# Patient Record
Sex: MALE | Race: WHITE | Employment: STUDENT | URBAN - METROPOLITAN AREA
[De-identification: names, ages, dates, MRNs, and addresses within clinical notes are randomized per-mention and may not be internally consistent; named-entity substitution may affect disease eponyms.]

---
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
## 2020-03-09 ENCOUNTER — OFFICE VISIT (OUTPATIENT)
Dept: PRIMARY CARE CLINIC | Age: 20
End: 2020-03-09

## 2020-03-09 VITALS
OXYGEN SATURATION: 98 % | RESPIRATION RATE: 16 BRPM | DIASTOLIC BLOOD PRESSURE: 64 MMHG | HEIGHT: 70 IN | SYSTOLIC BLOOD PRESSURE: 109 MMHG | BODY MASS INDEX: 21.99 KG/M2 | TEMPERATURE: 98.2 F | WEIGHT: 153.6 LBS | HEART RATE: 94 BPM

## 2020-03-09 DIAGNOSIS — R09.81 SINUS CONGESTION: ICD-10-CM

## 2020-03-09 DIAGNOSIS — J02.9 SORE THROAT: Primary | ICD-10-CM

## 2020-03-09 LAB
S PYO AG THROAT QL: NEGATIVE
VALID INTERNAL CONTROL?: YES

## 2020-03-09 NOTE — PROGRESS NOTES
Chief Complaint   Patient presents with    Cold Symptoms     woke three days ago and he had alot of snot in the throat and was told he has glovis sensation. congestion he can feel it and throat hurts when he swallows. coughing up and vomiting and has headache      When he was in the hospital at [facility] and he stopped the meds afterwards because he was hearing voices.

## 2020-03-09 NOTE — LETTER
NOTIFICATION OF RETURN TO WORK / SCHOOL 
 
3/9/2020 Mr. Jessica Armendariz 
[address] [facility] [address] To Whom It May Concern: 
 
Jessica Armendariz was under the care of [facility]. Please excuse him from today 3/9/2020. If there are questions or concerns please have the patient contact our office. Sincerely, Rocio Orta NP

## 2020-03-09 NOTE — PROGRESS NOTES
[facility]   [address], [address] [facility]  P: [phone]  F: [phone]      Chief Complaint   Patient presents with    Cold Symptoms     woke three days ago and he had alot of snot in the throat and was told he has glovis sensation. congestion he can feel it and throat hurts when he swallows. coughing up and vomiting and has headache        SUBJECTIVE   Blaise Bautista is a 23 y.o. male who presents to clinic for Cold Symptoms (woke three days ago and he had alot of snot in the throat and was told he has glovis sensation. congestion he can feel it and throat hurts when he swallows. coughing up and vomiting and has headache ). HPI:    Olayinka Marie is a 23 yr old male who presents with 3 days of postnasal drip, sinus congestion, and sore throat. He also reports nausea with vomiting that he attributes to sinus drainage.     Patient Active Problem List    Diagnosis    Impotence due to erectile dysfunction    Hyperactive pharyngeal gag reflex    Bipolar II disorder, severe, depressed, with anxious distress ([address]    Borderline personality disorder in adolescent ([address]    Cannabis abuse, episodic    Attention deficit hyperactivity disorder (ADHD), predominantly inattentive type    Irritable bowel syndrome with both constipation and diarrhea          Past Medical History:   Diagnosis Date    ADD (attention deficit disorder)     ADD (attention deficit disorder)     Anxiety     Depression     Irritable bowel syndrome with both constipation and diarrhea 10/16/2018     Past Surgical History:   Procedure Laterality Date    HX OTHER SURGICAL      Egd     Social History     Socioeconomic History    Marital status: SINGLE     Spouse name: Not on file    Number of children: Not on file    Years of education: Not on file    Highest education level: Not on file   Occupational History    Not on file   Social Needs    Financial resource strain: Not on file    Food insecurity: Worry: Not on file     Inability: Not on file    Transportation needs:     Medical: Not on file     Non-medical: Not on file   Tobacco Use    Smoking status: Never Smoker    Smokeless tobacco: Never Used   Substance and Sexual Activity    Alcohol use: No    Drug use: Yes     Types: Marijuana    Sexual activity: Never   Lifestyle    Physical activity:     Days per week: Not on file     Minutes per session: Not on file    Stress: Not on file   Relationships    Social connections:     Talks on phone: Not on file     Gets together: Not on file     Attends Jain service: Not on file     Active member of club or organization: Not on file     Attends meetings of clubs or organizations: Not on file     Relationship status: Not on file    Intimate partner violence:     Fear of current or ex partner: Not on file     Emotionally abused: Not on file     Physically abused: Not on file     Forced sexual activity: Not on file   Other Topics Concern    Not on file   Social History Narrative    Not on file     Family History   Problem Relation Age of Onset    Other Mother         autoimmune disease - Sjogrens    Cancer Maternal Grandfather 61        stomach     No Known Allergies    Current Outpatient Medications   Medication Sig Dispense Refill    benzonatate (TESSALON PERLES) 100 mg capsule Take 1 Cap by mouth three (3) times daily as needed for Cough. 30 Cap 2    omeprazole (PRILOSEC) 40 mg capsule Take 1 Cap by mouth daily. 14 Cap 1    dicyclomine (BENTYL) 10 mg capsule TAKE 1 CAPSULE BY MOUTH THREE TIMES DAILY 90 Cap 0    ARIPiprazole (ABILIFY) 10 mg tablet Take 1 Tab by mouth daily. 90 Tab 1    OXcarbazepine (TRILEPTAL) 300 mg tablet Take 1 Tab by mouth two (2) times a day. 180 Tab 1    traZODone (DESYREL) 50 mg tablet Take 1 Tab by mouth nightly. Indications: insomnia associated with depression 90 Tab 1           The medications were reviewed and updated in the medical record.   The past medical history, past surgical history, and family history were reviewed and updated in the medical record. REVIEW OF SYSTEMS   Review of Systems   Constitutional: Negative for malaise/fatigue. HENT: Positive for congestion, sinus pain and sore throat. Eyes: Negative for blurred vision and pain. Respiratory: Negative for cough and shortness of breath. Cardiovascular: Negative for chest pain and palpitations. Gastrointestinal: Negative for abdominal pain and heartburn. Genitourinary: Negative for frequency and urgency. Musculoskeletal: Negative for joint pain and myalgias. Neurological: Negative for dizziness, tingling, sensory change, weakness and headaches. Psychiatric/Behavioral: Negative for depression, memory loss and substance abuse. PHYSICAL EXAM     Visit Vitals  /64 (BP 1 Location: Left arm, BP Patient Position: Sitting)   Pulse 94   Temp 98.2 °F (36.8 °C) (Oral)   Resp 16   Ht 5' 10\" (1.778 m)   Wt 153 lb 9.6 oz (69.7 kg)   SpO2 98%   BMI 22.04 kg/m²       Physical Exam  Vitals signs and nursing note reviewed. HENT:      Head: Normocephalic and atraumatic. Right Ear: Hearing, tympanic membrane, ear canal and external ear normal.      Left Ear: Hearing, tympanic membrane, ear canal and external ear normal.      Nose: Congestion and rhinorrhea present. Mouth/Throat:      Pharynx: Posterior oropharyngeal erythema present. Tonsils: No tonsillar exudate. Swellin+ on the right. 1+ on the left. Cardiovascular:      Rate and Rhythm: Normal rate and regular rhythm. Heart sounds: Normal heart sounds. Pulmonary:      Effort: Pulmonary effort is normal.      Breath sounds: Normal breath sounds. Musculoskeletal: Normal range of motion. Skin:     General: Skin is warm and dry. Neurological:      Mental Status: He is alert and oriented to person, place, and time. Gait: Gait is intact.    Psychiatric:         Mood and Affect: Affect normal.         Judgment: Judgment normal.       ASSESSMENT/ PLAN   Diagnoses and all orders for this visit:    1. Sore throat  -     AMB POC RAPID STREP A negative   -Culture declined. Discussed likely viral symptoms. Encouraged rest, increased fluids, Tylenol/Ibuprofen for pain/fever and warm salt-water gargles as tolerated. Return to office if no improvement in 4-5 days. 2.  Sinus congestion       -Sinus Treatments:  1. Nasal rinses:  Saline rinses or salt water rinses or Neti pot  2. Anti-histamines: allegra (fenofexadine) or claritn (loratidine) or zyrtec (certizine)  3. Nasal steroids: Nasacort and Flonase (are over the counter & prescription)    Disclaimer:  Advised patient to call back or return to office if symptoms worsen/change/persist.  Discussed expected course/resolution/complications of diagnosis in detail with patient.     Medication risks/benefits/alternatives discussed with patient. Patient was given an after visit summary which includes diagnoses, current medications, & vitals.      Discussed patient instructions and advised to read to all patient instructions regarding care.      Patient expressed understanding with the diagnosis and plan. This note will not be viewable in [address]         Cassy Arnold NP  3/9/2020        (This document has been electronically signed)

## 2020-03-11 ENCOUNTER — HOSPITAL ENCOUNTER (EMERGENCY)
Age: 20
Discharge: HOME | End: 2020-03-12
Attending: EMERGENCY MEDICINE
Payer: COMMERCIAL

## 2020-03-11 DIAGNOSIS — R11.2 CANNABINOID HYPEREMESIS SYNDROME: Primary | ICD-10-CM

## 2020-03-11 DIAGNOSIS — F12.90 CANNABINOID HYPEREMESIS SYNDROME: Primary | ICD-10-CM

## 2020-03-11 LAB
ALBUMIN SERPL-MCNC: 4.3 G/DL (ref 3.5–5)
ALBUMIN/GLOB SERPL: 1.4 {RATIO} (ref 1.1–2.2)
ALP SERPL-CCNC: 90 U/L (ref 45–117)
ALT SERPL-CCNC: 17 U/L (ref 12–78)
ANION GAP SERPL CALC-SCNC: 11 MMOL/L (ref 5–15)
AST SERPL-CCNC: 12 U/L (ref 15–37)
BASOPHILS # BLD: 0 K/UL (ref 0–0.1)
BASOPHILS NFR BLD: 0 % (ref 0–1)
BILIRUB SERPL-MCNC: 0.9 MG/DL (ref 0.2–1)
BUN SERPL-MCNC: 16 MG/DL (ref 6–20)
BUN/CREAT SERPL: 15 (ref 12–20)
CALCIUM SERPL-MCNC: 8.7 MG/DL (ref 8.5–10.1)
CHLORIDE SERPL-SCNC: 106 MMOL/L (ref 97–108)
CO2 SERPL-SCNC: 27 MMOL/L (ref 21–32)
COMMENT, HOLDF: NORMAL
CREAT SERPL-MCNC: 1.07 MG/DL (ref 0.7–1.3)
D DIMER PPP FEU-MCNC: <0.19 MG/L FEU (ref 0–0.65)
DIFFERENTIAL METHOD BLD: NORMAL
EOSINOPHIL # BLD: 0.2 K/UL (ref 0–0.4)
EOSINOPHIL NFR BLD: 2 % (ref 0–7)
ERYTHROCYTE [DISTWIDTH] IN BLOOD BY AUTOMATED COUNT: 11.8 % (ref 11.5–14.5)
GLOBULIN SER CALC-MCNC: 3 G/DL (ref 2–4)
GLUCOSE SERPL-MCNC: 81 MG/DL (ref 65–100)
HCT VFR BLD AUTO: 38.1 % (ref 36.6–50.3)
HGB BLD-MCNC: 13.4 G/DL (ref 12.1–17)
IMM GRANULOCYTES # BLD AUTO: 0 K/UL (ref 0–0.04)
IMM GRANULOCYTES NFR BLD AUTO: 0 % (ref 0–0.5)
LIPASE SERPL-CCNC: 129 U/L (ref 73–393)
LYMPHOCYTES # BLD: 2.7 K/UL (ref 0.8–3.5)
LYMPHOCYTES NFR BLD: 29 % (ref 12–49)
MAGNESIUM SERPL-MCNC: 2 MG/DL (ref 1.6–2.4)
MCH RBC QN AUTO: 32 PG (ref 26–34)
MCHC RBC AUTO-ENTMCNC: 35.2 G/DL (ref 30–36.5)
MCV RBC AUTO: 90.9 FL (ref 80–99)
MONOCYTES # BLD: 1 K/UL (ref 0–1)
MONOCYTES NFR BLD: 11 % (ref 5–13)
NEUTS SEG # BLD: 5.1 K/UL (ref 1.8–8)
NEUTS SEG NFR BLD: 58 % (ref 32–75)
NRBC # BLD: 0 K/UL (ref 0–0.01)
NRBC BLD-RTO: 0 PER 100 WBC
PHOSPHATE SERPL-MCNC: 4.4 MG/DL (ref 2.6–4.7)
PLATELET # BLD AUTO: 231 K/UL (ref 150–400)
PMV BLD AUTO: 10.3 FL (ref 8.9–12.9)
POTASSIUM SERPL-SCNC: 3.7 MMOL/L (ref 3.5–5.1)
PROT SERPL-MCNC: 7.3 G/DL (ref 6.4–8.2)
RBC # BLD AUTO: 4.19 M/UL (ref 4.1–5.7)
SAMPLES BEING HELD,HOLD: NORMAL
SODIUM SERPL-SCNC: 144 MMOL/L (ref 136–145)
TROPONIN I SERPL-MCNC: <0.05 NG/ML
WBC # BLD AUTO: 9 K/UL (ref 4.1–11.1)

## 2020-03-11 PROCEDURE — 85379 FIBRIN DEGRADATION QUANT: CPT

## 2020-03-11 PROCEDURE — 84484 ASSAY OF TROPONIN QUANT: CPT

## 2020-03-11 PROCEDURE — 83735 ASSAY OF MAGNESIUM: CPT

## 2020-03-11 PROCEDURE — 93005 ELECTROCARDIOGRAM TRACING: CPT

## 2020-03-11 PROCEDURE — 74011250636 HC RX REV CODE- 250/636: Performed by: EMERGENCY MEDICINE

## 2020-03-11 PROCEDURE — 99285 EMERGENCY DEPT VISIT HI MDM: CPT

## 2020-03-11 PROCEDURE — 85025 COMPLETE CBC W/AUTO DIFF WBC: CPT

## 2020-03-11 PROCEDURE — 96375 TX/PRO/DX INJ NEW DRUG ADDON: CPT

## 2020-03-11 PROCEDURE — 36415 COLL VENOUS BLD VENIPUNCTURE: CPT

## 2020-03-11 PROCEDURE — 96374 THER/PROPH/DIAG INJ IV PUSH: CPT

## 2020-03-11 PROCEDURE — 83690 ASSAY OF LIPASE: CPT

## 2020-03-11 PROCEDURE — 84100 ASSAY OF PHOSPHORUS: CPT

## 2020-03-11 PROCEDURE — 80053 COMPREHEN METABOLIC PANEL: CPT

## 2020-03-11 RX ORDER — METOCLOPRAMIDE HYDROCHLORIDE 5 MG/ML
10 INJECTION INTRAMUSCULAR; INTRAVENOUS
Status: COMPLETED | OUTPATIENT
Start: 2020-03-12 | End: 2020-03-11

## 2020-03-11 RX ORDER — KETOROLAC TROMETHAMINE 30 MG/ML
30 INJECTION, SOLUTION INTRAMUSCULAR; INTRAVENOUS
Status: COMPLETED | OUTPATIENT
Start: 2020-03-12 | End: 2020-03-11

## 2020-03-11 RX ORDER — HALOPERIDOL 5 MG/ML
5 INJECTION INTRAMUSCULAR
Status: COMPLETED | OUTPATIENT
Start: 2020-03-12 | End: 2020-03-11

## 2020-03-11 RX ADMIN — HALOPERIDOL LACTATE 5 MG: 5 INJECTION INTRAMUSCULAR at 23:35

## 2020-03-11 RX ADMIN — KETOROLAC TROMETHAMINE 30 MG: 30 INJECTION, SOLUTION INTRAMUSCULAR at 23:36

## 2020-03-11 RX ADMIN — METOCLOPRAMIDE 10 MG: 5 INJECTION, SOLUTION INTRAMUSCULAR; INTRAVENOUS at 23:34

## 2020-03-11 RX ADMIN — SODIUM CHLORIDE 1000 ML: 900 INJECTION, SOLUTION INTRAVENOUS at 23:33

## 2020-03-12 ENCOUNTER — APPOINTMENT (OUTPATIENT)
Dept: GENERAL RADIOLOGY | Age: 20
End: 2020-03-12
Attending: EMERGENCY MEDICINE
Payer: COMMERCIAL

## 2020-03-12 VITALS
BODY MASS INDEX: 22.38 KG/M2 | DIASTOLIC BLOOD PRESSURE: 68 MMHG | RESPIRATION RATE: 15 BRPM | OXYGEN SATURATION: 97 % | HEART RATE: 71 BPM | SYSTOLIC BLOOD PRESSURE: 109 MMHG | WEIGHT: 156.31 LBS | TEMPERATURE: 98.9 F | HEIGHT: 70 IN

## 2020-03-12 LAB
AMPHET UR QL SCN: NEGATIVE
APPEARANCE UR: CLEAR
ATRIAL RATE: 86 BPM
BACTERIA URNS QL MICRO: ABNORMAL /HPF
BARBITURATES UR QL SCN: NEGATIVE
BENZODIAZ UR QL: NEGATIVE
BILIRUB UR QL: NEGATIVE
CALCULATED P AXIS, ECG09: 64 DEGREES
CALCULATED R AXIS, ECG10: 80 DEGREES
CALCULATED T AXIS, ECG11: 58 DEGREES
CANNABINOIDS UR QL SCN: POSITIVE
COCAINE UR QL SCN: NEGATIVE
COLOR UR: ABNORMAL
DIAGNOSIS, 93000: NORMAL
DRUG SCRN COMMENT,DRGCM: ABNORMAL
EPITH CASTS URNS QL MICRO: ABNORMAL /LPF
GLUCOSE UR STRIP.AUTO-MCNC: NEGATIVE MG/DL
HGB UR QL STRIP: NEGATIVE
KETONES UR QL STRIP.AUTO: NEGATIVE MG/DL
LEUKOCYTE ESTERASE UR QL STRIP.AUTO: NEGATIVE
METHADONE UR QL: NEGATIVE
NITRITE UR QL STRIP.AUTO: NEGATIVE
OPIATES UR QL: NEGATIVE
P-R INTERVAL, ECG05: 108 MS
PCP UR QL: NEGATIVE
PH UR STRIP: 6 [PH] (ref 5–8)
PROT UR STRIP-MCNC: NEGATIVE MG/DL
Q-T INTERVAL, ECG07: 338 MS
QRS DURATION, ECG06: 88 MS
QTC CALCULATION (BEZET), ECG08: 404 MS
RBC #/AREA URNS HPF: ABNORMAL /HPF (ref 0–5)
SP GR UR REFRACTOMETRY: 1.03 (ref 1–1.03)
UA: UC IF INDICATED,UAUC: ABNORMAL
UROBILINOGEN UR QL STRIP.AUTO: 1 EU/DL (ref 0.2–1)
VENTRICULAR RATE, ECG03: 86 BPM
WBC URNS QL MICRO: ABNORMAL /HPF (ref 0–4)

## 2020-03-12 PROCEDURE — 81001 URINALYSIS AUTO W/SCOPE: CPT

## 2020-03-12 PROCEDURE — 80307 DRUG TEST PRSMV CHEM ANLYZR: CPT

## 2020-03-12 PROCEDURE — 71046 X-RAY EXAM CHEST 2 VIEWS: CPT

## 2020-03-12 PROCEDURE — 87086 URINE CULTURE/COLONY COUNT: CPT

## 2020-03-12 RX ORDER — DICYCLOMINE HYDROCHLORIDE 10 MG/1
10 CAPSULE ORAL 4 TIMES DAILY
Qty: 20 CAP | Refills: 0 | Status: SHIPPED | OUTPATIENT
Start: 2020-03-12 | End: 2020-03-17

## 2020-03-12 RX ORDER — ONDANSETRON 8 MG/1
8 TABLET, ORALLY DISINTEGRATING ORAL
Qty: 10 TAB | Refills: 0 | Status: SHIPPED | OUTPATIENT
Start: 2020-03-12 | End: 2020-12-07

## 2020-03-12 NOTE — ED PROVIDER NOTES
The patient is a 24-year-old male with a past medical history significant for ADD, anxiety, bipolar disorder, IBS, marijuana abuse who presents to the ED with a complaint of intractable nausea, vomiting today accompanied by midsternal chest pain that he described as dull, crampy, that radiated to his throat and epigastrium area, severity 8 out of 10, without any aggravating or relieving factors. The patient state that he has been tired and dry heaving for the last 30 minutes. He denies any fever, sore throat, headache, blurred vision, cough, congestion, chest pain and shortness of breath with exertion, diarrhea, constipation, dysuria, sick contact at home, skin rash, unusual food sources, recent travel, prior abdominal surgery or history of the same. Past Medical History:   Diagnosis Date    ADD (attention deficit disorder)     ADD (attention deficit disorder)     Anxiety     Bipolar 1 disorder (HCC)     Depression     Irritable bowel syndrome with both constipation and diarrhea 10/16/2018       Past Surgical History:   Procedure Laterality Date    HX OTHER SURGICAL      Egd         Family History:   Problem Relation Age of Onset    Other Mother         autoimmune disease - Sjogrens    Cancer Maternal Grandfather 61        stomach       Social History     Socioeconomic History    Marital status: SINGLE     Spouse name: Not on file    Number of children: Not on file    Years of education: Not on file    Highest education level: Not on file   Occupational History    Not on file   Social Needs    Financial resource strain: Not on file    Food insecurity     Worry: Not on file     Inability: Not on file    Transportation needs     Medical: Not on file     Non-medical: Not on file   Tobacco Use    Smoking status: Current Some Day Smoker    Smokeless tobacco: Current User   Substance and Sexual Activity    Alcohol use:  Yes    Drug use: Yes     Types: Marijuana    Sexual activity: Never Lifestyle    Physical activity     Days per week: Not on file     Minutes per session: Not on file    Stress: Not on file   Relationships    Social connections     Talks on phone: Not on file     Gets together: Not on file     Attends Restoration service: Not on file     Active member of club or organization: Not on file     Attends meetings of clubs or organizations: Not on file     Relationship status: Not on file    Intimate partner violence     Fear of current or ex partner: Not on file     Emotionally abused: Not on file     Physically abused: Not on file     Forced sexual activity: Not on file   Other Topics Concern    Not on file   Social History Narrative    Not on file         ALLERGIES: Patient has no known allergies. Review of Systems   All other systems reviewed and are negative. Vitals:    03/11/20 2257 03/11/20 2300   BP: 129/90 125/76   Pulse:  90   Resp: 14 14   Temp:  98.9 °F (37.2 °C)   SpO2: 99% 99%   Weight:  70.9 kg (156 lb 4.9 oz)   Height:  5' 10\" (1.778 m)            Physical Exam  Vitals signs and nursing note reviewed. CONSTITUTIONAL: Well-appearing; well-nourished; in mild distress; very anxious and restless  HEAD: Normocephalic; atraumatic  EYES: PERRL; EOM intact; conjunctiva and sclera are clear bilaterally. ENT: No rhinorrhea; normal pharynx with no tonsillar hypertrophy; mucous membranes pink/moist, no erythema, no exudate. NECK: Supple; non-tender; no cervical lymphadenopathy  CARD: Normal S1, S2; no murmurs, rubs, or gallops. Regular rate and rhythm. RESP: Normal respiratory effort; breath sounds clear and equal bilaterally; no wheezes, rhonchi, or rales. ABD: Normal bowel sounds; non-distended; non-tender; no palpable organomegaly, no masses, no bruits. Back Exam: Normal inspection; no vertebral point tenderness, no CVA tenderness. Normal range of motion.   EXT: Normal ROM in all four extremities; non-tender to palpation; no swelling or deformity; distal pulses are normal, no edema. SKIN: Warm; dry; no rash. NEURO:Alert and oriented x 3, coherent, RYAN-XII grossly intact, sensory and motor are non-focal.        MDM  Number of Diagnoses or Management Options  Diagnosis management comments: Assessment: Intractable nausea, vomiting with chest pain and history of marijuana abuse rule out marijuana induced hyperemesis syndrome, electrolyte abnormality and dehydration. The patient is very anxious and restless but hemodynamically stable. Plan: Lab/IV fluid/antiemetic and analgesia/chest x-ray/serial exam/ Monitor and Reevaluate. Amount and/or Complexity of Data Reviewed  Clinical lab tests: ordered and reviewed  Tests in the radiology section of CPT®: ordered and reviewed  Tests in the medicine section of CPT®: reviewed and ordered  Discussion of test results with the performing providers: yes  Decide to obtain previous medical records or to obtain history from someone other than the patient: yes  Obtain history from someone other than the patient: yes  Review and summarize past medical records: yes  Discuss the patient with other providers: yes  Independent visualization of images, tracings, or specimens: yes    Risk of Complications, Morbidity, and/or Mortality  Presenting problems: moderate  Diagnostic procedures: moderate  Management options: moderate           Procedures    ED EKG interpretation:  Rhythm: normal sinus rhythm; and regular . Rate (approx.): 86; Axis: normal; P wave: normal; QRS interval: normal ; ST/T wave: normal; in  Lead: Diffusely; short ME interval; Other findings: borderline ekg. This EKG was interpreted by Vani Tyson MD,ED Provider. XRAY INTERPRETATION (ED MD)  Chest Xray  No acute process seen. Normal heart size. No bony abnormalities. No infiltrate. Maria Alejandra Moser MD 11:28 PM    Progress Note:   Pt has been reexamined by Vani Tyson MD. Pt is feeling much better. Symptoms have improved.  All available results have been reviewed with pt and any available family. The patient was given p.o. challenge that he tolerated well. He denies any further discomfort. Pt understands sx, dx, and tx in ED. Care plan has been outlined and questions have been answered. Pt is ready to go home. Will send home on marijuana abuse/hyperemesis/oral rehydration instruction. Prescription of Bentyl and Zofran. Outpatient referral with PCP as needed. Written by Mingo Pizarro MD,1:06 AM    .   .

## 2020-03-12 NOTE — ED NOTES
Patient PO challenged per MD request. Provided patient with apple juice. Patient tolerated well. No difficulty noted.

## 2020-03-12 NOTE — ED NOTES
Patient unable to void at this time. IV fluids infusing. Urinal at bedside. Will continue to monitor.

## 2020-03-12 NOTE — DISCHARGE INSTRUCTIONS
Patient Education     Patient Education        Marijuana Use in Teens: Care Instructions  Overview  During your exam, traces of marijuana were found in your body. The two most active chemicals in marijuana are THC and CBD. THC affects how you think, act, and feel. It can make you feel very happy or \"high. \" CBD can help you feel relaxed without the \"high. \" Marijuana products usually contain both THC and CBD. THC usually can be found in urine for a few days after marijuana is used. If you regularly use a lot of marijuana, THC may be found for weeks after use has stopped. There are many types, or strains, of marijuana. Each strain has specific THC-to-CBD ratios. Because of this, some strains have different kinds of effects than others. For example, if a strain of marijuana has a higher ratio of THC to CBD, it's more likely to affect your judgment, coordination, and decision making. In the [place], it's against federal law to possess, sell, give away, or grow marijuana for any purpose. But many states allow people with certain health problems to buy or grow it for their own use. And some states allow people over age 24 to use it for recreational reasons. Medical marijuana may be prescribed for teens with certain medical conditions. But recreational marijuana by teens is illegal everywhere in the [place]. Many schools and employers have strict rules about drug use. A positive drug test might cause you to be expelled from school or keep you from getting into a school you want to attend. You might not be able to take part in school sports or clubs. Or it might cause you to lose a job or keep you from getting hired. If you use marijuana, take steps to lower your risk. Follow-up care is a key part of your teen's treatment and safety. Be sure to make and go to all appointments, and call your doctor if your teen is having problems.  It's also a good idea to know your teen's test results and keep a list of the medicines your teen takes. How can you care for yourself at home? · To have the lowest risk, don't use marijuana. But if you do use it, limit your use. · Know what you're using. Choose products that have low levels of THC. The type (or strain), strength, and effects of marijuana can vary greatly. And understand that how soon you may feel the effects of the product you use and how long those effects may last can vary. It depends on how you take it. · Don't drive or operate machinery after you use marijuana. Using marijuana may affect your judgment, coordination, and decision making. · Don't smoke marijuana. The smoke can damage your lungs. If you do smoke it, don't breathe in deeply. And don't hold your breath. · Don't use marijuana with alcohol, tobacco, or illegal drugs. · Don't use synthetic marijuana, such as K2 and Spice. These drugs are stronger than marijuana. They can have very bad side effects. · Reduce the risk of medicine interactions. Marijuana can be dangerous if you take it with blood thinners or with medicines that make you sleepy, control your mood, or lower your blood pressure. · Keep others safe. Store marijuana in a safe and secure place. This is especially important with edible marijuana, which can be easily mistaken for treats or snacks. Make sure that children, friends, family, and pets can't get to it. And protect others from secondhand smoke. When should you call for help? Call your doctor now or seek immediate medical care if:    · You have new or worse symptoms of cannabis hyperemesis syndrome (CHS), such as:  ? Vomiting that doesn't stop. ? Not being able to keep down fluids. ? Belly pain. ? Symptoms that go away briefly when you take a hot bath or shower. This is one of the signs of CHS.     · You have symptoms of dehydration, such as:  ? Dry eyes and a dry mouth. ? Passing only a little dark urine. ?  Feeling thirstier than usual.    Watch closely for changes in your health, and contact your doctor if:    · You think you have a problem with marijuana use. Where can you learn more? Go to http://radha-ora.info/. Enter P293 in the search box to learn more about \"Marijuana Use in Teens: Care Instructions. \"  Current as of: February 5, 2019  Content Version: 12.2  © 2071-5740 [employer]. Care instructions adapted under license by [employer] (which disclaims liability or warranty for this information). If you have questions about a medical condition or this instruction, always ask your healthcare professional. [place] [address] any warranty or liability for your use of this information. Nausea and Vomiting: Care Instructions  Your Care Instructions    When you are nauseated, you may feel weak and sweaty and notice a lot of saliva in your mouth. Nausea often leads to vomiting. Most of the time you do not need to worry about nausea and vomiting, but they can be signs of other illnesses. Two common causes of nausea and vomiting are stomach flu and food poisoning. Nausea and vomiting from viral stomach flu will usually start to improve within 24 hours. Nausea and vomiting from food poisoning may last from 12 to 48 hours. The doctor has checked you carefully, but problems can develop later. If you notice any problems or new symptoms, get medical treatment right away. Follow-up care is a key part of your treatment and safety. Be sure to make and go to all appointments, and call your doctor if you are having problems. It's also a good idea to know your test results and keep a list of the medicines you take. How can you care for yourself at home? · To prevent dehydration, drink plenty of fluids, enough so that your urine is light yellow or clear like water. Choose water and other caffeine-free clear liquids until you feel better.  If you have kidney, heart, or liver disease and have to limit fluids, talk with your doctor before you increase the amount of fluids you drink. · Rest in bed until you feel better. · When you are able to eat, try clear soups, mild foods, and liquids until all symptoms are gone for 12 to 48 hours. Other good choices include dry toast, crackers, cooked cereal, and gelatin dessert, such as Jell-O. When should you call for help? Call 911 anytime you think you may need emergency care. For example, call if:    · You passed out (lost consciousness).    Call your doctor now or seek immediate medical care if:    · You have symptoms of dehydration, such as:  ? Dry eyes and a dry mouth. ? Passing only a little dark urine. ? Feeling thirstier than usual.     · You have new or worsening belly pain.     · You have a new or higher fever.     · You vomit blood or what looks like coffee grounds.    Watch closely for changes in your health, and be sure to contact your doctor if:    · You have ongoing nausea and vomiting.     · Your vomiting is getting worse.     · Your vomiting lasts longer than 2 days.     · You are not getting better as expected. Where can you learn more? Go to http://radha-ora.info/. Enter 25 [id number] in the search box to learn more about \"Nausea and Vomiting: Care Instructions. \"  Current as of: June 26, 2019  Content Version: 12.2  © 9195-8068 [employer]. Care instructions adapted under license by [employer] (which disclaims liability or warranty for this information). If you have questions about a medical condition or this instruction, always ask your healthcare professional. [place] [address] any warranty or liability for your use of this information.

## 2020-03-12 NOTE — ED TRIAGE NOTES
Patient arrives for c/o, \"I've throwing up and having pain in my center of my chest for the past 20 minutes. \".

## 2020-03-12 NOTE — ED NOTES
Discharge instructions reviewed with pt and copy given along with RX by this RN. Pt ambulatory form ED accompanied by girlfriend in no sign of distress or discomfort. Patient educated on follow up with PCP.

## 2020-03-13 LAB
BACTERIA SPEC CULT: NORMAL
SERVICE CMNT-IMP: NORMAL

## 2020-10-14 ENCOUNTER — OFFICE VISIT (OUTPATIENT)
Dept: PRIMARY CARE CLINIC | Age: 20
End: 2020-10-14
Payer: COMMERCIAL

## 2020-10-14 VITALS
RESPIRATION RATE: 16 BRPM | WEIGHT: 135 LBS | OXYGEN SATURATION: 98 % | DIASTOLIC BLOOD PRESSURE: 69 MMHG | HEIGHT: 70 IN | TEMPERATURE: 98.7 F | BODY MASS INDEX: 19.33 KG/M2 | HEART RATE: 80 BPM | SYSTOLIC BLOOD PRESSURE: 109 MMHG

## 2020-10-14 DIAGNOSIS — R56.9 SEIZURE-LIKE ACTIVITY (HCC): Primary | ICD-10-CM

## 2020-10-14 LAB
ALBUMIN SERPL-MCNC: 4.7 G/DL (ref 3.5–5)
ALBUMIN/GLOB SERPL: 1.6 {RATIO} (ref 1.1–2.2)
ALP SERPL-CCNC: 82 U/L (ref 45–117)
ALT SERPL-CCNC: 16 U/L (ref 12–78)
ANION GAP SERPL CALC-SCNC: 6 MMOL/L (ref 5–15)
AST SERPL-CCNC: 12 U/L (ref 15–37)
BILIRUB SERPL-MCNC: 0.6 MG/DL (ref 0.2–1)
BUN SERPL-MCNC: 14 MG/DL (ref 6–20)
BUN/CREAT SERPL: 14 (ref 12–20)
CALCIUM SERPL-MCNC: 9.6 MG/DL (ref 8.5–10.1)
CHLORIDE SERPL-SCNC: 106 MMOL/L (ref 97–108)
CK SERPL-CCNC: 112 U/L (ref 39–308)
CO2 SERPL-SCNC: 29 MMOL/L (ref 21–32)
CREAT SERPL-MCNC: 1 MG/DL (ref 0.7–1.3)
ERYTHROCYTE [DISTWIDTH] IN BLOOD BY AUTOMATED COUNT: 11.9 % (ref 11.5–14.5)
GLOBULIN SER CALC-MCNC: 2.9 G/DL (ref 2–4)
GLUCOSE SERPL-MCNC: 88 MG/DL (ref 65–100)
HCT VFR BLD AUTO: 41.9 % (ref 36.6–50.3)
HGB BLD-MCNC: 14.3 G/DL (ref 12.1–17)
MAGNESIUM SERPL-MCNC: 2.2 MG/DL (ref 1.6–2.4)
MCH RBC QN AUTO: 32.4 PG (ref 26–34)
MCHC RBC AUTO-ENTMCNC: 34.1 G/DL (ref 30–36.5)
MCV RBC AUTO: 94.8 FL (ref 80–99)
NRBC # BLD: 0 K/UL (ref 0–0.01)
NRBC BLD-RTO: 0 PER 100 WBC
PHOSPHATE SERPL-MCNC: 4.3 MG/DL (ref 2.6–4.7)
PLATELET # BLD AUTO: 264 K/UL (ref 150–400)
PMV BLD AUTO: 10.6 FL (ref 8.9–12.9)
POTASSIUM SERPL-SCNC: 4.4 MMOL/L (ref 3.5–5.1)
PROT SERPL-MCNC: 7.6 G/DL (ref 6.4–8.2)
RBC # BLD AUTO: 4.42 M/UL (ref 4.1–5.7)
SODIUM SERPL-SCNC: 141 MMOL/L (ref 136–145)
TSH SERPL DL<=0.05 MIU/L-ACNC: 0.62 UIU/ML (ref 0.36–3.74)
WBC # BLD AUTO: 5.7 K/UL (ref 4.1–11.1)

## 2020-10-14 PROCEDURE — 99213 OFFICE O/P EST LOW 20 MIN: CPT | Performed by: NURSE PRACTITIONER

## 2020-10-14 NOTE — PROGRESS NOTES
Chief Complaint   Patient presents with    Spasms     patient presents today c/o muscle spasms that have not resolved

## 2020-10-14 NOTE — PROGRESS NOTES
[facility]   [address], [address] [facility]  P: [phone]  F: [phone]    IRVIN Hopson is a 23 y.o. male who presents to clinic for Spasms (patient presents today c/o muscle spasms that have not resolved). Presents today after an episode last week where he felt dizzy for about 45 minutes, and had a several minute episode of muscle twitching including his arms and mouth. He does report smoking marijuana prior to the episode but denies alcohol or other illicit drug use. He states the movements stopped on their own, he did not seek medical treatment afterwards. Denies any bowel or bladder incontinence during the episode, denies any fatigue or tiredness afterwards. He denies a previous history of seizures, does report that he is off all mental health medication that he has been taking for bipolar and borderline personality disorder including oxcarbazepine and Abilify.       Patient Active Problem List    Diagnosis    Impotence due to erectile dysfunction    Hyperactive pharyngeal gag reflex    Bipolar II disorder, severe, depressed, with anxious distress ([address]    Borderline personality disorder in adolescent [facility]    Cannabis abuse, episodic    Attention deficit hyperactivity disorder (ADHD), predominantly inattentive type    Irritable bowel syndrome with both constipation and diarrhea      Past Medical History:   Diagnosis Date    ADD (attention deficit disorder)     ADD (attention deficit disorder)     Anxiety     Bipolar 1 disorder ([facility]     Depression     Irritable bowel syndrome with both constipation and diarrhea 10/16/2018     Past Surgical History:   Procedure Laterality Date    HX OTHER SURGICAL      Egd     Social History     Socioeconomic History    Marital status: SINGLE     Spouse name: Not on file    Number of children: Not on file    Years of education: Not on file    Highest education level: Not on file   Occupational History    Not on file   Social Needs    Financial resource strain: Not on file    Food insecurity     Worry: Not on file     Inability: Not on file    Transportation needs     Medical: Not on file     Non-medical: Not on file   Tobacco Use    Smoking status: Current Some Day Smoker    Smokeless tobacco: Current User   Substance and Sexual Activity    Alcohol use: Yes    Drug use: Yes     Types: Marijuana    Sexual activity: Never   Lifestyle    Physical activity     Days per week: Not on file     Minutes per session: Not on file    Stress: Not on file   Relationships    Social connections     Talks on phone: Not on file     Gets together: Not on file     Attends [employer] service: Not on file     Active member of club or organization: Not on file     Attends meetings of clubs or organizations: Not on file     Relationship status: Not on file    Intimate partner violence     Fear of current or ex partner: Not on file     Emotionally abused: Not on file     Physically abused: Not on file     Forced sexual activity: Not on file   Other Topics Concern    Not on file   Social History Narrative    Not on file     Family History   Problem Relation Age of Onset    Other Mother         autoimmune disease - Sjogrens    Cancer Maternal Grandfather 61        stomach     No Known Allergies    Current Outpatient Medications   Medication Sig Dispense Refill    omeprazole (PRILOSEC) 40 mg capsule Take 1 Cap by mouth daily. 14 Cap 1    ARIPiprazole (ABILIFY) 10 mg tablet Take 1 Tab by mouth daily. 90 Tab 1    OXcarbazepine (TRILEPTAL) 300 mg tablet Take 1 Tab by mouth two (2) times a day. 180 Tab 1    traZODone (DESYREL) 50 mg tablet Take 1 Tab by mouth nightly. Indications: insomnia associated with depression 90 Tab 1    ondansetron (Zofran ODT) 8 mg disintegrating tablet Take 1 Tab by mouth every eight (8) hours as needed for Nausea or Vomiting.  10 Tab 0    benzonatate (TESSALON PERLES) 100 mg capsule Take 1 Cap by mouth three (3) times daily as needed for Cough. 30 Cap 2           The medications were reviewed and updated in the medical record. The past medical history, past surgical history, and family history were reviewed and updated in the medical record. REVIEW OF SYSTEMS   Review of Systems   Constitutional: Negative for malaise/fatigue. HENT: Negative for congestion. Eyes: Negative for blurred vision and pain. Respiratory: Negative for cough and shortness of breath. Cardiovascular: Negative for chest pain and palpitations. Gastrointestinal: Negative for abdominal pain and heartburn. Genitourinary: Negative for frequency and urgency. Musculoskeletal: Negative for joint pain and myalgias. Neurological: Negative for dizziness, tingling, sensory change, weakness and headaches. Psychiatric/Behavioral: Negative for depression, memory loss and substance abuse. PHYSICAL EXAM     Visit Vitals  /69 (BP 1 Location: Left arm, BP Patient Position: Sitting)   Pulse 80   Temp 98.7 °F (37.1 °C) (Temporal)   Resp 16   Ht 5' 10\" (1.778 m)   Wt 135 lb (61.2 kg)   SpO2 98%   BMI 19.37 kg/m²       Physical Exam  Vitals signs and nursing note reviewed. Constitutional:       Comments: Thin appearing     HENT:      Head: Normocephalic and atraumatic. Right Ear: External ear normal.      Left Ear: External ear normal.   Eyes:      Pupils: Pupils are equal, round, and reactive to light. Cardiovascular:      Rate and Rhythm: Normal rate and regular rhythm. Heart sounds: Normal heart sounds, S1 normal and S2 normal. No murmur. No friction rub. No gallop. Pulmonary:      Effort: Pulmonary effort is normal.      Breath sounds: Normal breath sounds. Musculoskeletal: Normal range of motion. Skin:     General: Skin is warm and dry. Neurological:      Mental Status: He is alert and oriented to person, place, and time. Gait: Gait is intact.       Deep Tendon Reflexes:      Reflex Scores: Patellar reflexes are 2+ on the right side and 2+ on the left side. Psychiatric:         Mood and Affect: Affect normal.         Judgment: Judgment normal.       ASSESSMENT/ PLAN   Diagnoses and all orders for this visit:    1. Seizure-like activity (HCC)  -     CBC W/O DIFF; Future  -     METABOLIC PANEL, COMPREHENSIVE; Future  -     TSH 3RD GENERATION; Future  -     CK; Future  -     MAGNESIUM; Future  -     PHOSPHORUS; Future  -     REFERRAL TO NEUROLOGY  -Discussed possibly related to marijuana use vs discontinuation of his mental health medications. We discussed he has had some dramatic weight loss, would like him to eat 3 regular meals a day and consider a protein supplement daily. Increase hydration as well. Follow up with neurology. Disclaimer:  Advised patient to call back or return to office if symptoms worsen/change/persist.  Discussed expected course/resolution/complications of diagnosis in detail with patient.     Medication risks/benefits/alternatives discussed with patient. Patient was given an after visit summary which includes diagnoses, current medications, & vitals.      Discussed patient instructions and advised to read to all patient instructions regarding care.      Patient expressed understanding with the diagnosis and plan. This note will not be viewable in [address]         Janine Gunn NP  10/14/2020        (This document has been electronically signed)

## 2020-10-15 NOTE — PROGRESS NOTES
Please call and let him know his lab work is fine, I want him to go ahead and follow-up with the neurologist.

## 2020-10-26 ENCOUNTER — OFFICE VISIT (OUTPATIENT)
Dept: NEUROLOGY | Facility: CLINIC | Age: 20
End: 2020-10-26
Payer: COMMERCIAL

## 2020-10-26 VITALS
BODY MASS INDEX: 19.33 KG/M2 | OXYGEN SATURATION: 98 % | RESPIRATION RATE: 16 BRPM | DIASTOLIC BLOOD PRESSURE: 60 MMHG | HEART RATE: 80 BPM | WEIGHT: 135 LBS | HEIGHT: 70 IN | SYSTOLIC BLOOD PRESSURE: 100 MMHG

## 2020-10-26 DIAGNOSIS — R55 SYNCOPE, UNSPECIFIED SYNCOPE TYPE: Primary | ICD-10-CM

## 2020-10-26 PROCEDURE — 99204 OFFICE O/P NEW MOD 45 MIN: CPT | Performed by: PSYCHIATRY & NEUROLOGY

## 2020-10-26 NOTE — PROGRESS NOTES
Hot and dizzy, and fall in floor, couldnot move and speech, hand is stuck, muscle spasm 2 weeks ago, and there is another episode a month ago.

## 2020-10-26 NOTE — PROGRESS NOTES
[facility]   NEW PATIENT EVALUATION/CONSULTATION       PATIENT NAME: Shane Brown    MRN: [id number]    REASON FOR CONSULTATION: Syncope, episode of facial twitching     10/26/20    HISTORY OF PRESENT ILLNESS:  Shane Brown is a 23 y.o. right hand dominant female with past medical history significant for untreated anxiety, bipolar disorder type II as well as borderline personality disorder presents to [facility] neurology clinic for follow-up of tubes paroxysmal spells. Patient endorses a spell approximately 1 month ago when he was hanging out with his girlfriend watching TV while she was sleeping when he began to have abnormal movements of his face further described. Not clear how long the episode lasted there is no alteration of consciousness denies any other associated symptoms. In 2 weeks ago he says he was watching movie with friends when he suddenly bent over feeling significantly dizzy and vertiginous and pushed the table away as he lost consciousness. Apparently his friends thought that he fell asleep and after checking in on him after an unknown period of time conscious. They then brought him downstairs outside where he was cool where he was awake and cognizant recognizing everybody and where he was. However he noted ongoing tremulousness/jerking of his hands and feet at the time. Presented to his primary nurse practitioner with his complaints and was referred to neurology. He says since his episode 2 weeks ago he said intermittent random jerks of his legs as well as tremulousness without clear myoclonus and no recurrent episodes. Denies prior history of seizures, febrile seizures or CNS trauma or infection or a family history of seizures. During the second episode he denied any chest pain palpitations or GI discomfort.   Denies any history of déjà vu or jamais vu. States that he stopped taking his psychiatric medications in January 2020 as he began to experience auditory hallucinations which he attributed to the medications. Denies any further episodes of hallucinations but does endorse ongoing episodes of anxiety/panic which he treats conservatively. PAST MEDICAL HISTORY:  Past Medical History:   Diagnosis Date    ADD (attention deficit disorder)     ADD (attention deficit disorder)     Anxiety     Bipolar 1 disorder (HCC)     Depression     Irritable bowel syndrome with both constipation and diarrhea 10/16/2018       PAST SURGICAL HISTORY:  Past Surgical History:   Procedure Laterality Date    HX OTHER SURGICAL      Egd       FAMILY HISTORY:   Family History   Problem Relation Age of Onset    Other Mother         autoimmune disease - Sjogrens    Cancer Maternal Grandfather 61        stomach         SOCIAL HISTORY:  Social History     Socioeconomic History    Marital status: SINGLE     Spouse name: Not on file    Number of children: Not on file    Years of education: Not on file    Highest education level: Not on file   Tobacco Use    Smoking status: Current Some Day Smoker    Smokeless tobacco: Current User   Substance and Sexual Activity    Alcohol use: Yes    Drug use: Yes     Types: Marijuana    Sexual activity: Never         MEDICATIONS:   Current Outpatient Medications   Medication Sig Dispense Refill    ondansetron (Zofran ODT) 8 mg disintegrating tablet Take 1 Tab by mouth every eight (8) hours as needed for Nausea or Vomiting. 10 Tab 0    benzonatate (TESSALON PERLES) 100 mg capsule Take 1 Cap by mouth three (3) times daily as needed for Cough. 30 Cap 2    omeprazole (PRILOSEC) 40 mg capsule Take 1 Cap by mouth daily. 14 Cap 1    ARIPiprazole (ABILIFY) 10 mg tablet Take 1 Tab by mouth daily. 90 Tab 1    OXcarbazepine (TRILEPTAL) 300 mg tablet Take 1 Tab by mouth two (2) times a day. 180 Tab 1    traZODone (DESYREL) 50 mg tablet Take 1 Tab by mouth nightly.  Indications: insomnia associated with depression 90 Tab 1         ALLERGIES:  No Known Allergies      REVIEW OF SYSTEMS:  10 point ROS reviewed with patient. Please see scanned document under media. PHYSICAL EXAM:  Vital Signs:   Visit Vitals  /60 (BP 1 Location: Left arm, BP Patient Position: Sitting)   Pulse 80   Resp 16   Ht 5' 10\" (1.778 m)   Wt 61.2 kg (135 lb)   SpO2 98%   BMI 19.37 kg/m²        General Medical Exam:  General:  Well appearing, comfortable, in no apparent distress. Eyes/ENT: see cranial nerve examination. Neck: No masses appreciated. Full range of motion without tenderness. Respiratory:  Clear to auscultation, good air entry bilaterally. Cardiac:  Regular rate and rhythm, no murmur. GI:  Soft, non-tender, non-distended abdomen. Bowel sounds normal. No masses, organomegaly. Extremities:  No deformities, edema, or skin discoloration. Skin:  No rashes or lesions. Neurological:  · Mental Status:  Appears alert and oriented. Anxious affect which leads to inattention at times. Speech pressured, fumbling on occasion, otherwise without dysarthria or hypophonia. Language fluent without evidence for aphasia. .   · Cranial Nerves:   CNII/III/IV/VI: visual fields full to confrontation, EOMI, PERRL, no ptosis or nystagmus. CN V: Facial sensation intact bilaterally  CN VII: Facial muscles symmetric and strong   CN VIII: Hears finger rub well bilaterally, intact vestibular function   CN IX/X: Normal palatal movement   CN XI: Full strength shoulder shrug bilaterally   CN XII: Tongue protrusion full and midline without fasciculation or atrophy  · Motor: Normal tone and muscle bulk with no pronator drift.    Individual muscle group testing:  Shoulder abduction:   Left:5/5   Right : 5/5    Elbow flexion:      Left:5/5   Right : 5/5  Elbow extension:    Left:5/5   Right : 5/5   Wrist flexion:    Left:5/5   Right : 5/5  Wrist extension:    Left:5/5   Right : 5/5   Finger flexion:    Left:5/5   Right : 5/5    Finger extension:   Left:5/5   Right : 5/5   Hip flexion: Left:5/5   Right : 5/5         Knee flexion:    Left:5/5   Right : 5/5    Knee extension:   Left:5/5   Right : 5/5    Dorsiflexion:     Left:5/5   Right : 5/5  Plantar flexion:    Left:5/5   Right : 5/5      · MSRs: No crossed adductors or clonus. RIGHT  LEFT   Brachioradialis 1+ 1+   Biceps 1+ 1+   Triceps 1+ 1+   Knee 1+ 1+   Achilles 1+ 1+        Plantar response Downward Downward     · Sensation: Normal and symmetric perception of fine touch and temperature. Romberg absent   · Coordination: No dysmetria. Finger-nose-finger intact in upper extremities. · Gait: Primary gait and station intact     PERTINENT DATA:  None    ASSESSMENT:      Leah Baumann is a 23year old RH dominant male who presents to [facility]rology clinic for apparent episode of vasovagal syncope    PLAN:  Syncope:   Suspect vasovagal syncope secondary to sporadic anxiety v. Psychogenic  However, given age with obtain routine EEG to evaluate for interictal discharges, echocardiogram to evaluate for structural cardiac abnormalities  Given suspicion for isolated episode, will not confine to driving restrictions for now, can reevaluate if recurrent events noted    Follow-up in one month    > 45 minutes were spent personally by me during this visit, of which > 50% of the time was spent engaged in counseling and coordination of care     Sangeeta Major MD       CC Referring provider:  Rj Montgomery NP

## 2020-11-03 ENCOUNTER — VIRTUAL VISIT (OUTPATIENT)
Dept: PRIMARY CARE CLINIC | Age: 20
End: 2020-11-03
Payer: COMMERCIAL

## 2020-11-03 ENCOUNTER — TELEPHONE (OUTPATIENT)
Dept: PRIMARY CARE CLINIC | Age: 20
End: 2020-11-03

## 2020-11-03 DIAGNOSIS — J01.40 ACUTE PANSINUSITIS, RECURRENCE NOT SPECIFIED: Primary | ICD-10-CM

## 2020-11-03 DIAGNOSIS — K21.9 GASTROESOPHAGEAL REFLUX DISEASE, UNSPECIFIED WHETHER ESOPHAGITIS PRESENT: ICD-10-CM

## 2020-11-03 PROCEDURE — 99213 OFFICE O/P EST LOW 20 MIN: CPT | Performed by: NURSE PRACTITIONER

## 2020-11-03 RX ORDER — OMEPRAZOLE 40 MG/1
40 CAPSULE, DELAYED RELEASE ORAL DAILY
Qty: 90 CAP | Refills: 1 | Status: SHIPPED | OUTPATIENT
Start: 2020-11-03 | End: 2020-12-07

## 2020-11-03 NOTE — TELEPHONE ENCOUNTER
lvm for patient that we could switch him to [facility] schedule for tomorrow if that would be agreeable.

## 2020-11-03 NOTE — PROGRESS NOTES
[facility]   [address] [facility], [address] [place] [address]  P: [phone]  F: [phone]    IRVIN Dash is a 23 y.o. male who is seen over telehealth for Sinus Pain. HPI   Presents with 1 week of sinus pain, pressure, clear rhinorrhea, postnasal drip. He is taking Mucinex which has been somewhat helpful. Endorses acid reflux and belching. Tried omeprazole at home and found it effective. Patient Active Problem List    Diagnosis    Impotence due to erectile dysfunction    Hyperactive pharyngeal gag reflex    Bipolar II disorder, severe, depressed, with anxious distress ([facility] [address]    Borderline personality disorder in adolescent ([facility] [address]    Cannabis abuse, episodic    Attention deficit hyperactivity disorder (ADHD), predominantly inattentive type    Irritable bowel syndrome with both constipation and diarrhea          Past Medical History:   Diagnosis Date    ADD (attention deficit disorder)     ADD (attention deficit disorder)     Anxiety     Bipolar 1 disorder ([facility]     Depression     Irritable bowel syndrome with both constipation and diarrhea 10/16/2018     Past Surgical History:   Procedure Laterality Date    HX OTHER SURGICAL      Egd     Social History     Socioeconomic History    Marital status: SINGLE     Spouse name: Not on file    Number of children: Not on file    Years of education: Not on file    Highest education level: Not on file   Occupational History    Not on file   Social Needs    Financial resource strain: Not on file    Food insecurity     Worry: Not on file     Inability: Not on file    Transportation needs     Medical: Not on file     Non-medical: Not on file   Tobacco Use    Smoking status: Current Some Day Smoker    Smokeless tobacco: Current User   Substance and Sexual Activity    Alcohol use:  Yes    Drug use: Yes     Types: Marijuana    Sexual activity: Never   Lifestyle    Physical activity     Days per week: Not on file Minutes per session: Not on file    Stress: Not on file   Relationships    Social connections     Talks on phone: Not on file     Gets together: Not on file     Attends Worship service: Not on file     Active member of club or organization: Not on file     Attends meetings of clubs or organizations: Not on file     Relationship status: Not on file    Intimate partner violence     Fear of current or ex partner: Not on file     Emotionally abused: Not on file     Physically abused: Not on file     Forced sexual activity: Not on file   Other Topics Concern    Not on file   Social History Narrative    Not on file     Family History   Problem Relation Age of Onset    Other Mother         autoimmune disease - Sjogrens    Cancer Maternal Grandfather 61        stomach     No Known Allergies    Current Outpatient Medications   Medication Sig Dispense Refill    omeprazole (PRILOSEC) 40 mg capsule Take 1 Cap by mouth daily. Indications: gastroesophageal reflux disease 90 Cap 1    ondansetron (Zofran ODT) 8 mg disintegrating tablet Take 1 Tab by mouth every eight (8) hours as needed for Nausea or Vomiting. 10 Tab 0    benzonatate (TESSALON PERLES) 100 mg capsule Take 1 Cap by mouth three (3) times daily as needed for Cough. 30 Cap 2    ARIPiprazole (ABILIFY) 10 mg tablet Take 1 Tab by mouth daily. 90 Tab 1    OXcarbazepine (TRILEPTAL) 300 mg tablet Take 1 Tab by mouth two (2) times a day. 180 Tab 1    traZODone (DESYREL) 50 mg tablet Take 1 Tab by mouth nightly. Indications: insomnia associated with depression 90 Tab 1           The medications were reviewed and updated in the medical record. The past medical history, past surgical history, and family history were reviewed and updated in the medical record. REVIEW OF SYSTEMS   Review of Systems   Constitutional: Negative for chills and fever. HENT: Positive for congestion (PND), sinus pain (for past week.) and sore throat. Negative for ear pain. Respiratory: Positive for cough (clearing throat. ). Negative for sputum production. Gastrointestinal: Positive for heartburn. Neurological: Positive for headaches. PHYSICAL EXAM   NO VITALS WERE TAKEN FOR THIS VISIT    Physical Exam  Constitutional:       Appearance: Normal appearance. HENT:      Head: Normocephalic and atraumatic. Right Ear: External ear normal.      Left Ear: External ear normal.      Nose: Congestion present. Right Sinus: Maxillary sinus tenderness and frontal sinus tenderness (per patient palpation) present. Left Sinus: Maxillary sinus tenderness and frontal sinus tenderness present. Eyes:      General:         Right eye: No discharge. Left eye: No discharge. Pulmonary:      Effort: Pulmonary effort is normal. No respiratory distress. Neurological:      Mental Status: He is alert and oriented to person, place, and time. Psychiatric:         Attention and Perception: Attention normal.         Mood and Affect: Mood normal.         Speech: Speech normal.         No results found for this or any previous visit (from the past 24 hour(s)). ASSESSMENT/ PLAN   Diagnoses and all orders for this visit:    1. Acute pansinusitis, recurrence not specified        - Mucinex D prn. Viral at this time. 2. Gastroesophageal reflux disease, unspecified whether esophagitis present  -     omeprazole (PRILOSEC) 40 mg capsule; Take 1 Cap by mouth daily. Indications: gastroesophageal reflux disease          ICD-10-CM ICD-9-CM   1. Acute pansinusitis, recurrence not specified  J01.40 461.8   2. Gastroesophageal reflux disease, unspecified whether esophagitis present  K21.9 530.81      Orders Placed This Encounter    omeprazole (PRILOSEC) 40 mg capsule     Sig: Take 1 Cap by mouth daily. Indications: gastroesophageal reflux disease     Dispense:  90 Cap     Refill:  1           Follow-up and Dispositions    · Return if symptoms worsen or fail to improve.          I was in the office while conducting this encounter. Consent:  He and/or his healthcare decision maker is aware that this patient-initiated Telehealth encounter is a billable service, with coverage as determined by his insurance carrier. He is aware that he may receive a bill and has provided verbal consent to proceed: Yes    This virtual visit was conducted via [employer]. Pursuant to the emergency declaration under the [facility] waiver authority and the [employer] Act, this Virtual  Visit was conducted to reduce the patient's risk of exposure to COVID-19 and provide continuity of care for an established patient. Services were provided through a video synchronous discussion virtually to substitute for in-person clinic visit. Due to this being a TeleHealth evaluation, many elements of the physical examination are unable to be assessed. Total Time: minutes: 11-20 minutes. Disclaimer:  Advised patient to call back or return to office if symptoms worsen/change/persist.  Discussed expected course/resolution/complications of diagnosis in detail with patient. Medication risks/benefits/alternatives discussed with patient. Patient was given an after visit summary which includes diagnoses, current medications, & vitals. Discussed patient instructions and advised to read to all patient instructions regarding care. Patient expressed understanding with the diagnosis and plan. This note will not be viewable in [address]         Abbie Mendieta NP  11/3/2020        (This document has been electronically signed)

## 2020-11-06 ENCOUNTER — HOSPITAL ENCOUNTER (OUTPATIENT)
Dept: NON INVASIVE DIAGNOSTICS | Age: 20
Discharge: HOME | End: 2020-11-06
Attending: PSYCHIATRY & NEUROLOGY
Payer: COMMERCIAL

## 2020-11-06 ENCOUNTER — HOSPITAL ENCOUNTER (OUTPATIENT)
Dept: NEUROLOGY | Age: 20
Discharge: HOME | End: 2020-11-06
Attending: PSYCHIATRY & NEUROLOGY
Payer: COMMERCIAL

## 2020-11-06 DIAGNOSIS — R55 SYNCOPE, UNSPECIFIED SYNCOPE TYPE: ICD-10-CM

## 2020-11-06 PROCEDURE — 95816 EEG AWAKE AND DROWSY: CPT

## 2020-11-06 PROCEDURE — 95816 EEG AWAKE AND DROWSY: CPT | Performed by: PSYCHIATRY & NEUROLOGY

## 2020-11-06 PROCEDURE — 93306 TTE W/DOPPLER COMPLETE: CPT

## 2020-11-07 ENCOUNTER — TELEPHONE (OUTPATIENT)
Dept: PRIMARY CARE CLINIC | Age: 20
End: 2020-11-07

## 2020-11-07 LAB
ECHO AV AREA PEAK VELOCITY: 2.79 CM2
ECHO AV PEAK GRADIENT: 3.55 MMHG
ECHO AV PEAK VELOCITY: 94.23 CM/S
ECHO LA AREA 4C: 9.31 CM2
ECHO LA TO AORTIC ROOT RATIO: 0.82
ECHO LA TO AORTIC ROOT RATIO: 0.82
ECHO LA VOL 4C: 15.88 ML (ref 18–58)
ECHO LV INTERNAL DIMENSION DIASTOLIC: 4.16 CM (ref 4.2–5.9)
ECHO LV INTERNAL DIMENSION SYSTOLIC: 2.9 CM
ECHO LV IVSD: 0.64 CM (ref 0.6–1)
ECHO LV MASS 2D: 83.7 G (ref 88–224)
ECHO LV POSTERIOR WALL DIASTOLIC: 0.76 CM (ref 0.6–1)
ECHO LVOT DIAM: 1.88 CM
ECHO LVOT PEAK GRADIENT: 3.61 MMHG
ECHO LVOT PEAK VELOCITY: 95.05 CM/S
ECHO MV A VELOCITY: 39.57 CM/S
ECHO MV E VELOCITY: 71.86 CM/S
ECHO MV E/A RATIO: 1.82
ECHO RV INTERNAL DIMENSION: 3.7 CM
ECHO RV TAPSE: 1.76 CM (ref 1.5–2)
ECHO TV REGURGITANT MAX VELOCITY: 205.33 CM/S
ECHO TV REGURGITANT PEAK GRADIENT: 16.86 MMHG

## 2020-11-07 NOTE — TELEPHONE ENCOUNTER
Patient needs prior auth for omeprazole. Do you want to start one for this or advise the patient to buy over the counter. This type of medication since available is hard to get covered.

## 2020-11-12 DIAGNOSIS — K21.9 GASTROESOPHAGEAL REFLUX DISEASE, UNSPECIFIED WHETHER ESOPHAGITIS PRESENT: ICD-10-CM

## 2020-11-12 RX ORDER — OMEPRAZOLE 40 MG/1
40 CAPSULE, DELAYED RELEASE ORAL DAILY
Qty: 90 CAP | Refills: 1 | Status: CANCELLED | OUTPATIENT
Start: 2020-11-12

## 2020-11-20 NOTE — PROCEDURES
ELECTROENCEPHALOGRAM REPORT     Patient Name: Max Dash  : 2000  Age: 23 y.o. Ordering physician: Anmol Rizzo MD  Date of EEG: 2020  Interpreting physician: Anmol Rizzo MD      PROCEDURE: Routine awake and drowsy video electroencephalogram (vEEG)      CLINICAL INDICATION: The patient is a 23 y.o. male who is being evaluated for recurrent syncope      DESCRIPTION OF THE RECORD:   During wakefulness, a well-formed posterior dominant alpha rhythm composed of 8 to 9 Hz alpha with symmetric anterior to posterior amplitude/frequency gradient is noted. Transition to drowsiness is manifest by fragmentation of the waking background with progressive increase in diffuse theta. No discrete sleep architecture is appreciated. No epileptiform discharges or electrographic seizures are noted. No letter graphic correlate to any of the annotated movements during the event is noted (eye twitching leg tension, random twitches). Subharmonic reactivity to photic stimulation is appreciated. Single-lead ECG demonstrates normal sinus rhythm. INTERPRETATION:     This is a normal awake and drowsy routine video EEG.     Glenys Hamman, MD

## 2020-12-07 ENCOUNTER — OFFICE VISIT (OUTPATIENT)
Dept: NEUROLOGY | Age: 20
End: 2020-12-07
Payer: COMMERCIAL

## 2020-12-07 VITALS
DIASTOLIC BLOOD PRESSURE: 60 MMHG | SYSTOLIC BLOOD PRESSURE: 112 MMHG | OXYGEN SATURATION: 98 % | HEART RATE: 74 BPM | BODY MASS INDEX: 19.33 KG/M2 | HEIGHT: 70 IN | WEIGHT: 135 LBS | RESPIRATION RATE: 18 BRPM

## 2020-12-07 DIAGNOSIS — R40.4 NONSPECIFIC PAROXYSMAL SPELL: Primary | ICD-10-CM

## 2020-12-07 PROCEDURE — 99212 OFFICE O/P EST SF 10 MIN: CPT | Performed by: PSYCHIATRY & NEUROLOGY

## 2020-12-07 NOTE — PATIENT INSTRUCTIONS
[facility]   Statement to Patients  April 1, 2014      In an effort to ensure the large volume of patient prescription refills is processed in the most efficient and expeditious manner, we are asking our patients to assist us by calling your Pharmacy for all prescription refills, this will include also your  Mail Order Pharmacy. The pharmacy will contact our office electronically to continue the refill process. Please do not wait until the last minute to call your pharmacy. We need at least 48 hours (2days) to fill prescriptions. We also encourage you to call your pharmacy before going to  your prescription to make sure it is ready. With regard to controlled substance prescription refill requests (narcotic refills) that need to be picked up at our office, we ask your cooperation by providing us with at least 72 hours (3days) notice that you will need a refill. We will not refill narcotic prescription refill requests after 4:00pm on any weekday, Monday through Thursday, or after 2:00pm on Fridays, or on the weekends. We encourage everyone to explore another way of getting your prescription refill request processed using [employer], our patient web portal through our electronic medical record system. [employer] is an efficient and effective way to communicate your medication request directly to the office and  downloadable as an davion on your smart phone . [employer] also features a review functionality that allows you to view your medication list as well as leave messages for your physician. Are you ready to get connected? If so please review the attatched instructions or speak to any of our staff to get you set up right away! Thank you so much for your cooperation. Should you have any questions please contact our Practice Administrator.     The Physicians and Staff,  [facility]

## 2020-12-07 NOTE — PROGRESS NOTES
Neurology Clinic Follow up Note    Patient ID:  Brad Vaughn  [id number]  79 y.o.  2000      Mr. Carlita Grey is here for follow up today of  Chief Complaint   Patient presents with    Seizure          Last Appointment With Me:  10/26/2020       Interval History: In the interval from prior follow-up, Mr. Carlita Grey endorses one recurrent episode 3 days ago. Says he was hanging out with his girlfriend around 1 PM in the afternoon when his left hand curled up and  he was unable to open a bottle. States that he struggled with it for some time but then began to feel an abnormal sensation in his chest and put himself on the ground over concern for recurrent episode though he never lost consciousness. Did endorse tightening in his bilateral legs without change in awareness. Inquired as to patient's anxiety level off of medication and he does admit that is quite prominent. Mentions being on multiple medications for his bipolar and borderline personality disorder says he simply never went back to a psychiatrist.  He is open to reestablishing care. Denies any thoughts of suicidal or homicidal ideation    PMHx/ PSHx/ FHx/ SHx:  Reviewed and unchanged previous visit. ROS:  Comprehensive review of systems negative except for as noted above. Objective:       Meds:      Exam:  Visit Vitals  /60   Pulse 74   Resp 18   Ht 5' 10\" (1.778 m)   Wt 135 lb (61.2 kg)   SpO2 98%   BMI 19.37 kg/m²     Physical examination:  Young male appearing stated age appearing somewhat anxious and fidgety in chair. HEENT is grossly unremarkable, lower face not examined due to presence of facemask. Neck appears supple. Cardiovascular, pulmonary abdominal exams are deferred. Extremities are warm/dry. Neurologically patient appears alert and oriented. Attention appears intact. Speech clear, language fluent. Cranials 2-12 appear grossly intact with exception of lower face. Patient moves all extremities with equal strength. Primary gait and station are intact.     LABS  Results for orders placed or performed during the hospital encounter of 11/06/20   ECHO ADULT COMPLETE   Result Value Ref Range    IVSd 0.64 0.6 - 1.0 cm    LVIDd 4.16 (A) 4.2 - 5.9 cm    LVIDs 2.90 cm    LVOT d 1.88 cm    LVPWd 0.76 0.6 - 1.0 cm    LVOT Peak Gradient 3.61 mmHg    LVOT Peak Velocity 95.05 cm/s    RVIDd 3.70 cm    LA Area 4C 9.31 cm2    LA Vol 4C 15.88 (A) 18 - 58 mL    Left Atrium to Aortic Root Ratio 0.82     Left Atrium to Aortic Root Ratio 0.82     Aortic Valve Area by Continuity of Peak Velocity 2.79 cm2    AoV PG 3.55 mmHg    Aortic Valve Systolic Peak Velocity 04.25 cm/s    MV A Randolph 39.57 cm/s    MV E Randolph 71.86 cm/s    Tapse 1.76 1.5 - 2.0 cm    Triscuspid Valve Regurgitation Peak Gradient 16.86 mmHg    TR Max Velocity 205.33 cm/s    MV E/A 1.82     LV Mass AL 83.7 88 - 224 g       Assessment:     Mary Samano is a 63-year-old male presents to [facility] neurology clinic for follow-up of atypical spells, possibly secondary to untreated psychiatric comorbidities      Plan:   Paroxysmal spells:  Given latest event, left hand stiffening with bilateral leg stiffening with retained awareness and some intrathoracic premonitory symptoms, discussed with patient that at this juncture may be reasonable to consider untreated mood disorder including anxiety as etiology to his events  Given description of his more recent event could consider dystonia as a possibility though this is entirely distinct from prior presentation or at least largely so  Mention we could consider ambulatory EEG and EMU referral but will be more prudent to address untreated psychiatric comorbidities first  Encourage patient to reach out to [address] behavioral health and if he is having difficulty scheduling an appointment to reach out to us for assistance we may be able to offer    Otherwise can follow-up as needed    Greater than 10 minutes were spent personally by me during this visit, of which 50% of time was engaged in counseling and coordination of care    Signed:  Gunjan Bullock MD  12/7/2020  3:43 PM

## 2020-12-09 ENCOUNTER — DOCUMENTATION ONLY (OUTPATIENT)
Dept: PRIMARY CARE CLINIC | Age: 20
End: 2020-12-09

## 2020-12-09 NOTE — PROGRESS NOTES
Prior [facility] has been completed but no response has been received,  Attempted calling [facility] to receive response and got transferred 5 times and then disconnected.

## 2021-01-14 ENCOUNTER — VIRTUAL VISIT (OUTPATIENT)
Dept: PRIMARY CARE CLINIC | Age: 21
End: 2021-01-14
Payer: COMMERCIAL

## 2021-01-14 DIAGNOSIS — F31.81 BIPOLAR II DISORDER, SEVERE, DEPRESSED, WITH ANXIOUS DISTRESS (HCC): Primary | ICD-10-CM

## 2021-01-14 DIAGNOSIS — K21.9 GASTROESOPHAGEAL REFLUX DISEASE, UNSPECIFIED WHETHER ESOPHAGITIS PRESENT: ICD-10-CM

## 2021-01-14 PROCEDURE — 99213 OFFICE O/P EST LOW 20 MIN: CPT | Performed by: NURSE PRACTITIONER

## 2021-01-14 RX ORDER — QUETIAPINE FUMARATE 25 MG/1
25 TABLET, FILM COATED ORAL
Qty: 30 TAB | Refills: 1 | Status: SHIPPED | OUTPATIENT
Start: 2021-01-14 | End: 2021-03-31 | Stop reason: ALTCHOICE

## 2021-01-14 RX ORDER — OMEPRAZOLE 40 MG/1
40 CAPSULE, DELAYED RELEASE ORAL DAILY
Qty: 90 CAP | Refills: 1 | Status: SHIPPED | OUTPATIENT
Start: 2021-01-14 | End: 2022-09-29

## 2021-01-14 RX ORDER — LAMOTRIGINE 25 MG/1
TABLET ORAL
Qty: 60 TAB | Refills: 0 | Status: CANCELLED | OUTPATIENT
Start: 2021-01-14

## 2021-01-29 ENCOUNTER — TELEPHONE (OUTPATIENT)
Dept: PRIMARY CARE CLINIC | Age: 21
End: 2021-01-29

## 2021-01-29 NOTE — TELEPHONE ENCOUNTER
Patient called in, verified his , patient said its been two weeks since on the new medicine JOSEPH Tavera NP prescribed. Patient said his feelings are more heightened, and he's much more emotional, and has had a few breakdowns. Patient wants to know if there is an alternative? [phone]. Told him that JOSEPH Tavera NP was out of office today, but I would forward this to the other NP Rosalio Gotti to review.

## 2021-01-29 NOTE — TELEPHONE ENCOUNTER
Called patient, verified  with patient, advised him to stop Seroquel, and I scheduled a VV with Mery Randolph NP for Monday.

## 2021-02-01 ENCOUNTER — VIRTUAL VISIT (OUTPATIENT)
Dept: PRIMARY CARE CLINIC | Age: 21
End: 2021-02-01
Payer: COMMERCIAL

## 2021-02-01 DIAGNOSIS — F31.81 BIPOLAR II DISORDER, SEVERE, DEPRESSED, WITH ANXIOUS DISTRESS (HCC): Primary | ICD-10-CM

## 2021-02-01 PROCEDURE — 99213 OFFICE O/P EST LOW 20 MIN: CPT | Performed by: NURSE PRACTITIONER

## 2021-02-01 NOTE — PROGRESS NOTES
[facility]   [address] [facility], [address] [place] [address]  P: [phone]  F: [phone]    SUBJECTIVE   Samira Padron is a 21 y.o. male who is seen over telehealth for Medication Evaluation and Bipolar, reports Seroquel gave him side effects including heightened emotions, agitation, and constipation. Discontinued the medication 2 days ago. He reports he still continues to struggle with depressionpresents today to discuss further medication for bipolar depression. He is currently in counseling sessions, but does not have a psychiatrist for medication management. Denies HI or SI.     Patient Active Problem List    Diagnosis    Impotence due to erectile dysfunction    Hyperactive pharyngeal gag reflex    Bipolar II disorder, severe, depressed, with anxious distress [facility]    Borderline personality disorder in adolescent ([facility]    Cannabis abuse, episodic    Attention deficit hyperactivity disorder (ADHD), predominantly inattentive type    Irritable bowel syndrome with both constipation and diarrhea      Past Medical History:   Diagnosis Date    ADD (attention deficit disorder)     ADD (attention deficit disorder)     Anxiety     Bipolar 1 disorder ([facility]     Depression     Irritable bowel syndrome with both constipation and diarrhea 10/16/2018     Past Surgical History:   Procedure Laterality Date    HX OTHER SURGICAL      Egd     Social History     Socioeconomic History    Marital status: SINGLE     Spouse name: Not on file    Number of children: Not on file    Years of education: Not on file    Highest education level: Not on file   Occupational History    Not on file   Social Needs    Financial resource strain: Not on file    Food insecurity     Worry: Not on file     Inability: Not on file    Transportation needs     Medical: Not on file     Non-medical: Not on file   Tobacco Use    Smoking status: Current Some Day Smoker    Smokeless tobacco: Current User   Substance and Sexual Activity    Alcohol use: Yes    Drug use: Yes     Types: Marijuana    Sexual activity: Never   Lifestyle    Physical activity     Days per week: Not on file     Minutes per session: Not on file    Stress: Not on file   Relationships    Social connections     Talks on phone: Not on file     Gets together: Not on file     Attends Jainism service: Not on file     Active member of club or organization: Not on file     Attends meetings of clubs or organizations: Not on file     Relationship status: Not on file    Intimate partner violence     Fear of current or ex partner: Not on file     Emotionally abused: Not on file     Physically abused: Not on file     Forced sexual activity: Not on file   Other Topics Concern    Not on file   Social History Narrative    Not on file     Family History   Problem Relation Age of Onset    Other Mother         autoimmune disease - Sjogrens    Cancer Maternal Grandfather 61        stomach     No Known Allergies    Current Outpatient Medications   Medication Sig Dispense Refill    lurasidone (LATUDA) 20 mg tab tablet Take 1 Tab by mouth daily (with dinner). 30 Tab 1    omeprazole (PRILOSEC) 40 mg capsule Take 1 Cap by mouth daily. Indications: gastroesophageal reflux disease 90 Cap 1    QUEtiapine (SEROquel) 25 mg tablet Take 1 Tab by mouth nightly. 30 Tab 1           The medications were reviewed and updated in the medical record. The past medical history, past surgical history, and family history were reviewed and updated in the medical record. REVIEW OF SYSTEMS   Review of Systems   Constitutional: Negative for malaise/fatigue. HENT: Negative for congestion. Eyes: Negative for blurred vision and pain. Respiratory: Negative for cough and shortness of breath. Cardiovascular: Negative for chest pain and palpitations. Gastrointestinal: Negative for abdominal pain and heartburn. Genitourinary: Negative for frequency and urgency. Musculoskeletal: Negative for joint pain and myalgias. Neurological: Negative for dizziness, tingling, sensory change, weakness and headaches. Psychiatric/Behavioral: Positive for depression. Negative for memory loss and substance abuse. PHYSICAL EXAM   NO VITALS WERE TAKEN FOR THIS VISIT    Physical Exam  Vitals signs and nursing note reviewed. Constitutional:       Appearance: Normal appearance. HENT:      Head: Normocephalic and atraumatic. Right Ear: External ear normal.      Left Ear: External ear normal.   Pulmonary:      Effort: Pulmonary effort is normal.   Musculoskeletal: Normal range of motion. Skin:     General: Skin is warm and dry. Neurological:      Mental Status: He is alert and oriented to person, place, and time. Mental status is at baseline. Gait: Gait is intact. Psychiatric:         Mood and Affect: Affect normal.         Speech: Speech normal.         Thought Content: Thought content normal.         Judgment: Judgment normal.       ASSESSMENT/ PLAN   Diagnoses and all orders for this visit:    1. Bipolar II disorder, severe, depressed, with anxious distress (HCC)  -     lurasidone (LATUDA) 20 mg tab tablet; Take 1 Tab by mouth daily (with dinner). -Advised psychiatry follow-up for med management. Discussed monitoring lipids/ triglycerides on Latuda. Follow-up in 1 month to  follow-up. I was in the office while conducting this encounter. Consent:  He and/or his healthcare decision maker is aware that this patient-initiated Telehealth encounter is a billable service, with coverage as determined by his insurance carrier. He is aware that he may receive a bill and has provided verbal consent to proceed: Yes    This virtual visit was conducted via [employer] me.   Pursuant to the emergency declaration under the [facility], [address] [place] [address] authority and the [id number] and [employer] Act, this Virtual  Visit was conducted to reduce the patient's risk of exposure to COVID-19 and provide continuity of care for an established patient. Services were provided through a video synchronous discussion virtually to substitute for in-person clinic visit. Due to this being a TeleHealth evaluation, many elements of the physical examination are unable to be assessed. Total Time: minutes: 11-20 minutes. Disclaimer:  Advised patient to call back or return to office if symptoms worsen/change/persist.  Discussed expected course/resolution/complications of diagnosis in detail with patient. Medication risks/benefits/alternatives discussed with patient. Patient was given an after visit summary which includes diagnoses, current medications, & vitals. Discussed patient instructions and advised to read to all patient instructions regarding care. Patient expressed understanding with the diagnosis and plan. This note will not be viewable in [address]         Cristofer Nieto NP  2/1/2021        (This document has been electronically signed)

## 2021-03-29 ENCOUNTER — TELEPHONE (OUTPATIENT)
Dept: PRIMARY CARE CLINIC | Age: 21
End: 2021-03-29

## 2021-03-31 ENCOUNTER — OFFICE VISIT (OUTPATIENT)
Dept: PRIMARY CARE CLINIC | Age: 21
End: 2021-03-31
Payer: COMMERCIAL

## 2021-03-31 VITALS
DIASTOLIC BLOOD PRESSURE: 71 MMHG | OXYGEN SATURATION: 98 % | HEART RATE: 78 BPM | WEIGHT: 137.4 LBS | SYSTOLIC BLOOD PRESSURE: 109 MMHG | HEIGHT: 70 IN | BODY MASS INDEX: 19.67 KG/M2 | TEMPERATURE: 98.8 F | RESPIRATION RATE: 16 BRPM

## 2021-03-31 DIAGNOSIS — K21.9 GASTROESOPHAGEAL REFLUX DISEASE WITHOUT ESOPHAGITIS: Primary | ICD-10-CM

## 2021-03-31 PROCEDURE — 99213 OFFICE O/P EST LOW 20 MIN: CPT | Performed by: NURSE PRACTITIONER

## 2021-03-31 RX ORDER — PANTOPRAZOLE SODIUM 40 MG/1
40 TABLET, DELAYED RELEASE ORAL DAILY
Qty: 30 TAB | Refills: 1 | Status: SHIPPED | OUTPATIENT
Start: 2021-03-31 | End: 2022-09-29

## 2021-03-31 RX ORDER — FAMOTIDINE 20 MG/1
20 TABLET, FILM COATED ORAL 2 TIMES DAILY
Qty: 30 TAB | Refills: 1 | Status: SHIPPED | OUTPATIENT
Start: 2021-03-31 | End: 2022-09-29

## 2021-03-31 NOTE — PROGRESS NOTES
[facility]   [address]  P: [phone]  F: [phone]    SUBJECTIVE   Andrea Angelucci is a 21 y.o. male who presents to clinic for GERD (patient is following up on reflux, the Prilosec doesn't work. ). Denies any known reflux triggers. Reports regurgitation of food in his mouth after eating. Denies nausea or vomiting. Does continue to vape/smoke cigarettes.     Patient Active Problem List    Diagnosis    Impotence due to erectile dysfunction    Hyperactive pharyngeal gag reflex    Bipolar II disorder, severe, depressed, with anxious distress ([facility]    Borderline personality disorder in adolescent ([facility]    Cannabis abuse, episodic    Attention deficit hyperactivity disorder (ADHD), predominantly inattentive type    Irritable bowel syndrome with both constipation and diarrhea          Past Medical History:   Diagnosis Date    ADD (attention deficit disorder)     ADD (attention deficit disorder)     Anxiety     Bipolar 1 disorder ([facility]     Depression     Irritable bowel syndrome with both constipation and diarrhea 10/16/2018     Past Surgical History:   Procedure Laterality Date    HX OTHER SURGICAL      Egd     Social History     Socioeconomic History    Marital status: SINGLE     Spouse name: Not on file    Number of children: Not on file    Years of education: Not on file    Highest education level: Not on file   Occupational History    Not on file   Social Needs    Financial resource strain: Not on file    Food insecurity     Worry: Not on file     Inability: Not on file    Transportation needs     Medical: Not on file     Non-medical: Not on file   Tobacco Use    Smoking status: Current Every Day Smoker    Smokeless tobacco: Current User   Substance and Sexual Activity    Alcohol use: Not Currently    Drug use: Yes     Types: Marijuana    Sexual activity: Never   Lifestyle    Physical activity     Days per week: Not on file Minutes per session: Not on file    Stress: Not on file   Relationships    Social connections     Talks on phone: Not on file     Gets together: Not on file     Attends Zoroastrianism service: Not on file     Active member of club or organization: Not on file     Attends meetings of clubs or organizations: Not on file     Relationship status: Not on file    Intimate partner violence     Fear of current or ex partner: Not on file     Emotionally abused: Not on file     Physically abused: Not on file     Forced sexual activity: Not on file   Other Topics Concern    Not on file   Social History Narrative    Not on file     Family History   Problem Relation Age of Onset    Other Mother         autoimmune disease - Sjogrens    Cancer Maternal Grandfather 61        stomach     No Known Allergies    Current Outpatient Medications   Medication Sig Dispense Refill    pantoprazole (PROTONIX) 40 mg tablet Take 1 Tab by mouth daily. 30 Tab 1    famotidine (PEPCID) 20 mg tablet Take 1 Tab by mouth two (2) times a day. 30 Tab 1    omeprazole (PRILOSEC) 40 mg capsule Take 1 Cap by mouth daily. Indications: gastroesophageal reflux disease 90 Cap 1           The medications were reviewed and updated in the medical record. The past medical history, past surgical history, and family history were reviewed and updated in the medical record. REVIEW OF SYSTEMS   Review of Systems   Constitutional: Negative for malaise/fatigue. HENT: Negative for congestion. Eyes: Negative for blurred vision and pain. Respiratory: Negative for cough and shortness of breath. Cardiovascular: Negative for chest pain and palpitations. Gastrointestinal: Positive for heartburn. Negative for abdominal pain. Genitourinary: Negative for frequency and urgency. Musculoskeletal: Negative for joint pain and myalgias. Neurological: Negative for dizziness, tingling, sensory change, weakness and headaches.    Psychiatric/Behavioral: Negative for depression, memory loss and substance abuse. PHYSICAL EXAM     Visit Vitals  /71 (BP 1 Location: Left upper arm, BP Patient Position: Sitting, BP Cuff Size: Small adult)   Pulse 78   Temp 98.8 °F (37.1 °C) (Temporal)   Resp 16   Ht 5' 10\" (1.778 m)   Wt 137 lb 6.4 oz (62.3 kg)   SpO2 98%   BMI 19.71 kg/m²       Physical Exam  Vitals signs and nursing note reviewed. HENT:      Head: Normocephalic and atraumatic. Right Ear: External ear normal.      Left Ear: External ear normal.   Cardiovascular:      Rate and Rhythm: Normal rate and regular rhythm. Heart sounds: Normal heart sounds, S1 normal and S2 normal. No murmur. No friction rub. No gallop. Pulmonary:      Effort: Pulmonary effort is normal.      Breath sounds: Normal breath sounds. Musculoskeletal: Normal range of motion. Skin:     General: Skin is warm and dry. Neurological:      Mental Status: He is alert and oriented to person, place, and time. Gait: Gait is intact. Psychiatric:         Mood and Affect: Affect normal.         Judgment: Judgment normal.       ASSESSMENT/ PLAN   Diagnoses and all orders for this visit:    1. Gastroesophageal reflux disease without esophagitis  - Smoking cessation.   -    Start pantoprazole (PROTONIX) 40 mg tablet; Take 1 Tab by mouth daily.  -   If not improving, start in addition to Protonix-   famotidine (PEPCID) 20 mg tablet; Take 1 Tab by mouth two (2) times a day. Disclaimer:  Advised patient to call back or return to office if symptoms worsen/change/persist.  Discussed expected course/resolution/complications of diagnosis in detail with patient.     Medication risks/benefits/alternatives discussed with patient. Patient was given an after visit summary which includes diagnoses, current medications, & vitals.      Discussed patient instructions and advised to read to all patient instructions regarding care.      Patient expressed understanding with the diagnosis and plan.    This note will not be viewable in [address]         Pablito Roy, NP  3/31/2021        (This document has been electronically signed)

## 2021-03-31 NOTE — PROGRESS NOTES
Chief Complaint   Patient presents with    GERD     patient is following up on reflux, the Prilosec doesn't work. 1. Have you been to the ER, urgent care clinic since your last visit? Hospitalized since your last visit? No    2. Have you seen or consulted any other health care providers outside of the [address] [place] [address] since your last visit? Include any pap smears or colon screening.  No    Visit Vitals  /71 (BP 1 Location: Left upper arm, BP Patient Position: Sitting, BP Cuff Size: Small adult)   Pulse 78   Temp 98.8 °F (37.1 °C) (Temporal)   Resp 16   Ht 5' 10\" (1.778 m)   Wt 137 lb 6.4 oz (62.3 kg)   SpO2 98%   BMI 19.71 kg/m²

## 2021-04-02 DIAGNOSIS — K21.9 GASTROESOPHAGEAL REFLUX DISEASE WITHOUT ESOPHAGITIS: ICD-10-CM

## 2021-04-05 RX ORDER — FAMOTIDINE 20 MG/1
20 TABLET, FILM COATED ORAL 2 TIMES DAILY
Qty: 30 TAB | Refills: 1 | OUTPATIENT
Start: 2021-04-05

## 2021-04-06 ENCOUNTER — TELEPHONE (OUTPATIENT)
Dept: PRIMARY CARE CLINIC | Age: 21
End: 2021-04-06

## 2021-04-06 DIAGNOSIS — F31.81 BIPOLAR II DISORDER, SEVERE, DEPRESSED, WITH ANXIOUS DISTRESS (HCC): ICD-10-CM

## 2021-04-06 RX ORDER — QUETIAPINE FUMARATE 25 MG/1
25 TABLET, FILM COATED ORAL
Qty: 30 TAB | Refills: 1 | Status: SHIPPED | OUTPATIENT
Start: 2021-04-06 | End: 2021-04-27 | Stop reason: SDUPTHER

## 2021-04-20 DIAGNOSIS — F31.81 BIPOLAR II DISORDER, SEVERE, DEPRESSED, WITH ANXIOUS DISTRESS (HCC): ICD-10-CM

## 2021-04-21 RX ORDER — QUETIAPINE FUMARATE 25 MG/1
25 TABLET, FILM COATED ORAL
Qty: 30 TAB | Refills: 1 | OUTPATIENT
Start: 2021-04-21

## 2021-04-27 DIAGNOSIS — F31.81 BIPOLAR II DISORDER, SEVERE, DEPRESSED, WITH ANXIOUS DISTRESS (HCC): ICD-10-CM

## 2021-04-30 RX ORDER — QUETIAPINE FUMARATE 25 MG/1
25 TABLET, FILM COATED ORAL
Qty: 30 TAB | Refills: 1 | Status: SHIPPED | OUTPATIENT
Start: 2021-04-30 | End: 2021-06-01 | Stop reason: SDUPTHER

## 2021-06-01 DIAGNOSIS — F31.81 BIPOLAR II DISORDER, SEVERE, DEPRESSED, WITH ANXIOUS DISTRESS (HCC): ICD-10-CM

## 2021-06-02 RX ORDER — QUETIAPINE FUMARATE 25 MG/1
25 TABLET, FILM COATED ORAL
Qty: 30 TABLET | Refills: 1 | Status: SHIPPED | OUTPATIENT
Start: 2021-06-02 | End: 2022-09-29

## 2022-03-18 PROBLEM — F60.3 BORDERLINE PERSONALITY DISORDER IN ADOLESCENT (HCC): Status: ACTIVE | Noted: 2018-11-14

## 2022-03-18 PROBLEM — F12.10 CANNABIS ABUSE, EPISODIC: Status: ACTIVE | Noted: 2018-11-14

## 2022-03-18 PROBLEM — N52.9 IMPOTENCE DUE TO ERECTILE DYSFUNCTION: Status: ACTIVE | Noted: 2019-11-06

## 2022-03-18 PROBLEM — F31.81 BIPOLAR II DISORDER, SEVERE, DEPRESSED, WITH ANXIOUS DISTRESS (HCC): Status: ACTIVE | Noted: 2018-11-16

## 2022-03-19 PROBLEM — J39.2: Status: ACTIVE | Noted: 2018-12-27

## 2022-03-20 PROBLEM — K58.2 IRRITABLE BOWEL SYNDROME WITH BOTH CONSTIPATION AND DIARRHEA: Status: ACTIVE | Noted: 2018-10-16

## 2022-03-20 PROBLEM — F90.0 ATTENTION DEFICIT HYPERACTIVITY DISORDER (ADHD), PREDOMINANTLY INATTENTIVE TYPE: Status: ACTIVE | Noted: 2018-10-16

## 2022-09-29 ENCOUNTER — OFFICE VISIT (OUTPATIENT)
Dept: PRIMARY CARE CLINIC | Age: 22
End: 2022-09-29
Payer: COMMERCIAL

## 2022-09-29 VITALS
SYSTOLIC BLOOD PRESSURE: 99 MMHG | BODY MASS INDEX: 20.62 KG/M2 | RESPIRATION RATE: 18 BRPM | OXYGEN SATURATION: 98 % | HEART RATE: 63 BPM | DIASTOLIC BLOOD PRESSURE: 67 MMHG | HEIGHT: 70 IN | WEIGHT: 144 LBS | TEMPERATURE: 97.1 F

## 2022-09-29 DIAGNOSIS — M25.531 RIGHT WRIST PAIN: Primary | ICD-10-CM

## 2022-09-29 PROCEDURE — 99213 OFFICE O/P EST LOW 20 MIN: CPT | Performed by: FAMILY MEDICINE

## 2022-09-29 RX ORDER — DICLOFENAC SODIUM 50 MG/1
50 TABLET, DELAYED RELEASE ORAL 2 TIMES DAILY
Qty: 28 TABLET | Refills: 0 | Status: SHIPPED | OUTPATIENT
Start: 2022-09-29

## 2022-09-29 RX ORDER — LAMOTRIGINE 200 MG/1
TABLET ORAL
COMMUNITY
Start: 2022-07-14

## 2022-09-29 NOTE — PROGRESS NOTES
1. \"Have you been to the ER, urgent care clinic since your last visit? Hospitalized since your last visit? \" No    2. \"Have you seen or consulted any other health care providers outside of the [address] [place] [address] since your last visit? \" No     3. For patients aged 39-70: Has the patient had a colonoscopy / FIT/ Cologuard? NA - based on age    .   Chief Complaint   Patient presents with    Establish Care    Wrist Pain     Right

## 2022-09-29 NOTE — PROGRESS NOTES
HPI     No chief complaint on file. He is a 24 y.o. male who presents to establish care. Pmhx : Bipolar disorder, borderline personality disorder, ADD, IBS, GERD. Follows with [facility]. Stable on lamictal.     Here today c/o right dorsal wrist pain, chronic intermittent problem for quite some time. Worse with certain positions/activities. He has not tried any therapies. Denies any injury. Thinks video venkat may be exacerbating this. Establishing Care  - Chronic medical problems:  Past Medical History:   Diagnosis Date    ADD (attention deficit disorder)     ADD (attention deficit disorder)     Anxiety     Bipolar 1 disorder (HCC)     Depression     Irritable bowel syndrome with both constipation and diarrhea 10/16/2018     - Current medications:   Current Outpatient Medications   Medication Sig    QUEtiapine (SEROquel) 25 mg tablet Take 1 Tablet by mouth nightly. pantoprazole (PROTONIX) 40 mg tablet Take 1 Tab by mouth daily. famotidine (PEPCID) 20 mg tablet Take 1 Tab by mouth two (2) times a day. omeprazole (PRILOSEC) 40 mg capsule Take 1 Cap by mouth daily. Indications: gastroesophageal reflux disease     No current facility-administered medications for this visit.      - Family history:   Family History   Problem Relation Age of Onset    Other Mother         autoimmune disease - Sjogrens    Cancer Maternal Grandfather 61        stomach     - Allergies: No Known Allergies  - Surgical history:   Past Surgical History:   Procedure Laterality Date    HX OTHER SURGICAL      Egd     - Social history (sexually active, occupation, smoker, etoh use, etc):   Social History     Socioeconomic History    Marital status: SINGLE     Spouse name: Not on file    Number of children: Not on file    Years of education: Not on file    Highest education level: Not on file   Occupational History    Not on file   Tobacco Use    Smoking status: Every Day    Smokeless tobacco: Current   Vaping Use    Vaping Use: Every day    Substances: THC    Devices: Pre-filled or refillable cartridge   Substance and Sexual Activity    Alcohol use: Not Currently    Drug use: Yes     Types: Marijuana    Sexual activity: Never   Other Topics Concern    Not on file   Social History Narrative    Not on file     Social Determinants of Health     Financial Resource Strain: Not on file   Food Insecurity: Not on file   Transportation Needs: Not on file   Physical Activity: Not on file   Stress: Not on file   Social Connections: Not on file   Intimate Partner Violence: Not on file   Housing Stability: Not on file         Review of Systems  Denies fever, chills, chest pain, shortness of breath, abd pain, nausea, vomiting    Reviewed PmHx, RxHx, FmHx, SocHx, AllgHx and updated and dated in the chart. Physical Exam:  There were no vitals taken for this visit. Physical Exam  Vitals reviewed. Constitutional:       Appearance: Normal appearance. HENT:      Head: Normocephalic and atraumatic. Cardiovascular:      Rate and Rhythm: Normal rate and regular rhythm. Pulses: Normal pulses. Heart sounds: Normal heart sounds. Pulmonary:      Effort: Pulmonary effort is normal.      Breath sounds: Normal breath sounds. Musculoskeletal:      Left lower leg: No edema. Comments: Left UE exam unremarkable. RUE exam : mild tenderness over dorsal ulnar wrist. No appreciated effusion. Reproducible pain with right wrist resisted pronation/supination and extension. Elbow rom intact without pain bilat. Radial pulses and UE sensation to light touch intact bilat. Skin:     General: Skin is warm and dry. Neurological:      Mental Status: He is alert. Assessment / Plan     Diagnoses and all orders for this visit:    1. Right wrist pain  -     XR WRIST RT AP/LAT; Future    Other orders  -     diclofenac EC (VOLTAREN) 50 mg EC tablet; Take 1 Tablet by mouth two (2) times a day.   Suspect soft tissue or tendon overuse syndrome. Xray and follow up as indicated. Try applied ice tid, nsaid x 1 week (take with food), wrist splint daily x 2 weeks. Follow up if not improved within 2 weeks. I have discussed the diagnosis with the patient and the intended plan as seen in the above orders. The patient has received an after-visit summary and questions were answered concerning future plans. I have discussed medication side effects and warnings with the patient as well.     Enma Casanova, DO

## 2023-06-22 ENCOUNTER — OFFICE VISIT (OUTPATIENT)
Dept: PRIMARY CARE CLINIC | Facility: CLINIC | Age: 23
End: 2023-06-22
Payer: COMMERCIAL

## 2023-06-22 VITALS
SYSTOLIC BLOOD PRESSURE: 112 MMHG | DIASTOLIC BLOOD PRESSURE: 70 MMHG | WEIGHT: 148 LBS | HEIGHT: 70 IN | HEART RATE: 69 BPM | BODY MASS INDEX: 21.19 KG/M2 | RESPIRATION RATE: 16 BRPM | TEMPERATURE: 97.6 F | OXYGEN SATURATION: 99 %

## 2023-06-22 DIAGNOSIS — R09.89 GLOBUS SENSATION: Primary | ICD-10-CM

## 2023-06-22 PROCEDURE — 4004F PT TOBACCO SCREEN RCVD TLK: CPT | Performed by: FAMILY MEDICINE

## 2023-06-22 PROCEDURE — 99213 OFFICE O/P EST LOW 20 MIN: CPT | Performed by: FAMILY MEDICINE

## 2023-06-22 PROCEDURE — G8428 CUR MEDS NOT DOCUMENT: HCPCS | Performed by: FAMILY MEDICINE

## 2023-06-22 PROCEDURE — G8420 CALC BMI NORM PARAMETERS: HCPCS | Performed by: FAMILY MEDICINE

## 2023-06-22 RX ORDER — OMEPRAZOLE 20 MG/1
20 TABLET, DELAYED RELEASE ORAL DAILY
Qty: 30 TABLET | Refills: 3 | Status: SHIPPED | OUTPATIENT
Start: 2023-06-22

## 2023-06-22 RX ORDER — ARIPIPRAZOLE 2 MG/1
TABLET ORAL
COMMUNITY
Start: 2023-06-13

## 2023-06-22 RX ORDER — BUSPIRONE HYDROCHLORIDE 5 MG/1
5 TABLET ORAL 3 TIMES DAILY
COMMUNITY

## 2023-06-22 RX ORDER — FLUTICASONE PROPIONATE 50 MCG
1 SPRAY, SUSPENSION (ML) NASAL DAILY
Qty: 32 G | Refills: 1 | Status: SHIPPED | OUTPATIENT
Start: 2023-06-22

## 2023-06-22 SDOH — ECONOMIC STABILITY: HOUSING INSECURITY
IN THE LAST 12 MONTHS, WAS THERE A TIME WHEN YOU DID NOT HAVE A STEADY PLACE TO SLEEP OR SLEPT IN A SHELTER (INCLUDING NOW)?: PATIENT REFUSED

## 2023-06-22 SDOH — ECONOMIC STABILITY: INCOME INSECURITY: HOW HARD IS IT FOR YOU TO PAY FOR THE VERY BASICS LIKE FOOD, HOUSING, MEDICAL CARE, AND HEATING?: PATIENT DECLINED

## 2023-06-22 SDOH — ECONOMIC STABILITY: FOOD INSECURITY: WITHIN THE PAST 12 MONTHS, THE FOOD YOU BOUGHT JUST DIDN'T LAST AND YOU DIDN'T HAVE MONEY TO GET MORE.: PATIENT DECLINED

## 2023-06-22 SDOH — ECONOMIC STABILITY: FOOD INSECURITY: WITHIN THE PAST 12 MONTHS, YOU WORRIED THAT YOUR FOOD WOULD RUN OUT BEFORE YOU GOT MONEY TO BUY MORE.: PATIENT DECLINED

## 2023-06-22 ASSESSMENT — PATIENT HEALTH QUESTIONNAIRE - PHQ9
SUM OF ALL RESPONSES TO PHQ QUESTIONS 1-9: 0
SUM OF ALL RESPONSES TO PHQ QUESTIONS 1-9: 0
2. FEELING DOWN, DEPRESSED OR HOPELESS: 0
3. TROUBLE FALLING OR STAYING ASLEEP: 0
SUM OF ALL RESPONSES TO PHQ QUESTIONS 1-9: 0
SUM OF ALL RESPONSES TO PHQ QUESTIONS 1-9: 0

## 2023-06-22 NOTE — PROGRESS NOTES
Chief Complaint   Patient presents with    Other     Inflamed uvula 3 years       /70 (Site: Left Upper Arm, Position: Sitting, Cuff Size: Small Adult)   Pulse 69   Temp 97.6 °F (36.4 °C) (Temporal)   Resp 16   Ht 5' 10\" (1.778 m)   Wt 148 lb (67.1 kg)   SpO2 99%   BMI 21.24 kg/m²     1. Have you been to the ER, urgent care clinic since your last visit? Hospitalized since your last visit?no    2. Have you seen or consulted any other health care providers outside of the [address] [place] [address] since your last visit? Include any pap smears or colon screening.  no
Oropharynx is clear. Comments: Uvula is slightly erythematous, midline. Tonsils appear normal.   Eyes:      Conjunctiva/sclera: Conjunctivae normal.      Pupils: Pupils are equal, round, and reactive to light. Cardiovascular:      Rate and Rhythm: Normal rate and regular rhythm. Heart sounds: Normal heart sounds. Pulmonary:      Effort: Pulmonary effort is normal.      Breath sounds: Normal breath sounds. Musculoskeletal:      Cervical back: Neck supple. No tenderness. Lymphadenopathy:      Cervical: No cervical adenopathy. Skin:     General: Skin is warm and dry. Neurological:      Mental Status: He is alert. Assessment/Plan   Diagnosis Orders   1. Globus sensation  omeprazole (PRILOSEC OTC) 20 MG tablet    fluticasone (FLONASE) 50 MCG/ACT nasal spray      Advised low acid diet, try omeprazole and flonase daily and follow up INI 2 weeks. He already has upcoming appnt with ENT. I have discussed the diagnosis with the patient and the intended plan as seen in the above orders. Patient verbalized understanding of the plan and agrees with the plan. I have discussed medication side effects and warnings with the patient as well. Informed patient to return to the office if new symptoms arise.         Anabell Lovelace,